# Patient Record
Sex: MALE | Race: WHITE | Employment: OTHER | URBAN - METROPOLITAN AREA
[De-identification: names, ages, dates, MRNs, and addresses within clinical notes are randomized per-mention and may not be internally consistent; named-entity substitution may affect disease eponyms.]

---

## 2017-06-18 ENCOUNTER — APPOINTMENT (OUTPATIENT)
Dept: CT IMAGING | Age: 72
End: 2017-06-18
Attending: EMERGENCY MEDICINE
Payer: MEDICARE

## 2017-06-18 ENCOUNTER — HOSPITAL ENCOUNTER (EMERGENCY)
Age: 72
Discharge: HOME | End: 2017-06-18
Attending: EMERGENCY MEDICINE
Payer: MEDICARE

## 2017-06-18 VITALS
OXYGEN SATURATION: 96 % | HEIGHT: 68 IN | WEIGHT: 164 LBS | HEART RATE: 62 BPM | BODY MASS INDEX: 24.86 KG/M2 | DIASTOLIC BLOOD PRESSURE: 72 MMHG | SYSTOLIC BLOOD PRESSURE: 154 MMHG | TEMPERATURE: 99 F | RESPIRATION RATE: 17 BRPM

## 2017-06-18 DIAGNOSIS — S00.03XA SCALP CONTUSION: Primary | ICD-10-CM

## 2017-06-18 PROCEDURE — 70450 CT HEAD/BRAIN W/O DYE: CPT | Performed by: EMERGENCY MEDICINE

## 2017-06-18 PROCEDURE — 99284 EMERGENCY DEPT VISIT MOD MDM: CPT

## 2017-06-18 RX ORDER — LISINOPRIL 10 MG/1
10 TABLET ORAL DAILY
COMMUNITY
End: 2017-07-24 | Stop reason: DRUGHIGH

## 2017-06-19 NOTE — ED PROVIDER NOTES
Patient Seen in: THE [facility] Emergency Department In [place]    History   Patient presents with:  Trauma (cardiovascular, musculoskeletal)    Stated Complaint: [address]    HPI    Patient slipped and fell tonight striking the right side of his foreh resting comfortably  Skin: Warm and dry without cyanosis or pallor. HEENT: Tender swelling to the right frontal scalp. No bony defect felt. Pupils equal round reactive to light. EOMI. No hemotympanum.   No intraoral injury  Neck: Supple and nontender

## 2017-07-24 ENCOUNTER — LAB ENCOUNTER (OUTPATIENT)
Dept: LAB | Age: 72
End: 2017-07-24
Attending: INTERNAL MEDICINE
Payer: MEDICARE

## 2017-07-24 ENCOUNTER — OFFICE VISIT (OUTPATIENT)
Dept: FAMILY MEDICINE CLINIC | Facility: CLINIC | Age: 72
End: 2017-07-24

## 2017-07-24 VITALS
BODY MASS INDEX: 24.86 KG/M2 | DIASTOLIC BLOOD PRESSURE: 90 MMHG | RESPIRATION RATE: 16 BRPM | TEMPERATURE: 98 F | WEIGHT: 164 LBS | SYSTOLIC BLOOD PRESSURE: 150 MMHG | HEIGHT: 68 IN | HEART RATE: 64 BPM

## 2017-07-24 DIAGNOSIS — R25.2 LEG CRAMPING: ICD-10-CM

## 2017-07-24 DIAGNOSIS — R26.89 BALANCE PROBLEM: ICD-10-CM

## 2017-07-24 DIAGNOSIS — M25.362 KNEE INSTABILITY, LEFT: Primary | ICD-10-CM

## 2017-07-24 DIAGNOSIS — I10 ESSENTIAL HYPERTENSION: ICD-10-CM

## 2017-07-24 DIAGNOSIS — G11.9 CEREBRAL ATAXIA (HCC): ICD-10-CM

## 2017-07-24 DIAGNOSIS — E78.00 PURE HYPERCHOLESTEROLEMIA: ICD-10-CM

## 2017-07-24 DIAGNOSIS — S89.92XD LEFT KNEE INJURY, SUBSEQUENT ENCOUNTER: ICD-10-CM

## 2017-07-24 DIAGNOSIS — R21 SKIN RASH: ICD-10-CM

## 2017-07-24 PROBLEM — M25.369 KNEE INSTABILITY: Status: ACTIVE | Noted: 2017-07-24

## 2017-07-24 PROBLEM — S89.92XA LEFT KNEE INJURY: Status: ACTIVE | Noted: 2017-07-24

## 2017-07-24 LAB
ALBUMIN SERPL-MCNC: 3.9 G/DL (ref 3.5–4.8)
ALP LIVER SERPL-CCNC: 76 U/L (ref 45–117)
ALT SERPL-CCNC: 48 U/L (ref 17–63)
AST SERPL-CCNC: 25 U/L (ref 15–41)
BASOPHILS # BLD AUTO: 0.02 X10(3) UL (ref 0–0.1)
BASOPHILS NFR BLD AUTO: 0.4 %
BILIRUB SERPL-MCNC: 0.3 MG/DL (ref 0.1–2)
BUN BLD-MCNC: 20 MG/DL (ref 8–20)
CALCIUM BLD-MCNC: 8.9 MG/DL (ref 8.3–10.3)
CHLORIDE: 108 MMOL/L (ref 101–111)
CO2: 27 MMOL/L (ref 22–32)
CREAT BLD-MCNC: 0.87 MG/DL (ref 0.7–1.3)
EOSINOPHIL # BLD AUTO: 0.08 X10(3) UL (ref 0–0.3)
EOSINOPHIL NFR BLD AUTO: 1.8 %
ERYTHROCYTE [DISTWIDTH] IN BLOOD BY AUTOMATED COUNT: 13.7 % (ref 11.5–16)
GLUCOSE BLD-MCNC: 121 MG/DL (ref 70–99)
HAV IGM SER QL: 2 MG/DL (ref 1.7–3)
HCT VFR BLD AUTO: 42.2 % (ref 37–53)
HGB BLD-MCNC: 13.6 G/DL (ref 13–17)
IMMATURE GRANULOCYTE COUNT: 0.02 X10(3) UL (ref 0–1)
IMMATURE GRANULOCYTE RATIO %: 0.4 %
LYMPHOCYTES # BLD AUTO: 1.46 X10(3) UL (ref 0.9–4)
LYMPHOCYTES NFR BLD AUTO: 32.5 %
M PROTEIN MFR SERPL ELPH: 7.2 G/DL (ref 6.1–8.3)
MCH RBC QN AUTO: 29.8 PG (ref 27–33.2)
MCHC RBC AUTO-ENTMCNC: 32.2 G/DL (ref 31–37)
MCV RBC AUTO: 92.3 FL (ref 80–99)
MONOCYTES # BLD AUTO: 0.35 X10(3) UL (ref 0.1–0.6)
MONOCYTES NFR BLD AUTO: 7.8 %
NEUTROPHIL ABS PRELIM: 2.56 X10 (3) UL (ref 1.3–6.7)
NEUTROPHILS # BLD AUTO: 2.56 X10(3) UL (ref 1.3–6.7)
NEUTROPHILS NFR BLD AUTO: 57.1 %
PLATELET # BLD AUTO: 130 10(3)UL (ref 150–450)
POTASSIUM SERPL-SCNC: 4.9 MMOL/L (ref 3.6–5.1)
RBC # BLD AUTO: 4.57 X10(6)UL (ref 3.8–5.8)
RED CELL DISTRIBUTION WIDTH-SD: 46.2 FL (ref 35.1–46.3)
SODIUM SERPL-SCNC: 141 MMOL/L (ref 136–144)
WBC # BLD AUTO: 4.5 X10(3) UL (ref 4–13)

## 2017-07-24 PROCEDURE — 80053 COMPREHEN METABOLIC PANEL: CPT

## 2017-07-24 PROCEDURE — 85025 COMPLETE CBC W/AUTO DIFF WBC: CPT

## 2017-07-24 PROCEDURE — 99204 OFFICE O/P NEW MOD 45 MIN: CPT | Performed by: INTERNAL MEDICINE

## 2017-07-24 PROCEDURE — 36415 COLL VENOUS BLD VENIPUNCTURE: CPT

## 2017-07-24 PROCEDURE — 83735 ASSAY OF MAGNESIUM: CPT

## 2017-07-24 RX ORDER — NAPROXEN SODIUM 220 MG
1-2 TABLET ORAL DAILY PRN
Status: ON HOLD | COMMUNITY
End: 2020-07-09

## 2017-07-24 RX ORDER — LISINOPRIL 5 MG/1
1 TABLET ORAL DAILY
Refills: 0 | COMMUNITY
Start: 2017-05-03 | End: 2018-10-30

## 2017-07-24 RX ORDER — CLOTRIMAZOLE AND BETAMETHASONE DIPROPIONATE 10; .64 MG/G; MG/G
1 CREAM TOPICAL 2 TIMES DAILY PRN
Qty: 60 G | Refills: 1 | Status: SHIPPED | OUTPATIENT
Start: 2017-07-24 | End: 2019-05-07 | Stop reason: ALTCHOICE

## 2017-07-24 NOTE — PROGRESS NOTES
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 Wanted to be seen for new options.    Notices balance issues and instability   Has fallen in the past.   Has been Essential hypertension     Pure hypercholesterolemia     Knee instability     Left knee injury      Past Surgical History:  No date: CATARACT  No date: EYE SURGERY  No date: OTHER SURGICAL HISTORY      Comment: RCR left    Family History   Problem Relation Orders Placed This Encounter      CBC [6399]      COMP METABOLIC PANEL [26784]      Magnesium [E]     Signed Prescriptions Disp Refills    clotrimazole-betamethasone 1-0.05 % External Cream 60 g 1      Sig: Apply 1 Application topically 2 (two) times

## 2017-07-27 ENCOUNTER — HOSPITAL ENCOUNTER (OUTPATIENT)
Dept: ULTRASOUND IMAGING | Facility: HOSPITAL | Age: 72
Discharge: HOME | End: 2017-07-27
Attending: INTERNAL MEDICINE
Payer: MEDICARE

## 2017-07-27 DIAGNOSIS — S89.92XA LEFT KNEE INJURY, INITIAL ENCOUNTER: ICD-10-CM

## 2017-07-27 DIAGNOSIS — R26.81 UNSTEADY GAIT: ICD-10-CM

## 2017-07-27 DIAGNOSIS — G54.0 BRACHIAL PLEXUS DISORDERS: Primary | ICD-10-CM

## 2017-07-27 DIAGNOSIS — R25.2 LEG CRAMPING: ICD-10-CM

## 2017-07-27 DIAGNOSIS — G54.0 BRACHIAL PLEXUS DISORDERS: ICD-10-CM

## 2017-07-27 DIAGNOSIS — M25.362 KNEE INSTABILITY, LEFT: ICD-10-CM

## 2017-07-27 PROCEDURE — 93923 UPR/LXTR ART STDY 3+ LVLS: CPT | Performed by: INTERNAL MEDICINE

## 2017-08-09 ENCOUNTER — OFFICE VISIT (OUTPATIENT)
Dept: NEUROLOGY | Facility: CLINIC | Age: 72
End: 2017-08-09

## 2017-08-09 VITALS — HEART RATE: 60 BPM | RESPIRATION RATE: 16 BRPM | DIASTOLIC BLOOD PRESSURE: 70 MMHG | SYSTOLIC BLOOD PRESSURE: 132 MMHG

## 2017-08-09 DIAGNOSIS — G11.8 SPINOCEREBELLAR ATAXIA (HCC): Primary | ICD-10-CM

## 2017-08-09 DIAGNOSIS — R29.2 HYPER REFLEXIA: ICD-10-CM

## 2017-08-09 DIAGNOSIS — R27.0 ATAXIA: ICD-10-CM

## 2017-08-09 PROCEDURE — 99204 OFFICE O/P NEW MOD 45 MIN: CPT | Performed by: OTHER

## 2017-08-09 NOTE — H&P
[facility] Patient / Consult Visit    Alva Dyson is a 70year old male.                          Referring MD: Maribell Lomas    Patient presents with:  Neurologic Problem      HPI:    Alva Dyson is a 70year old, who presents for e Packs/day: 0.00      Years: 0.00      Smokeless tobacco: Never Used                      Comment: quit many years ago  Alcohol use:  Yes              Comment: occasionly     Family History   Problem Relation Age of Onset   • Diabetes Father    • Cancer Moth sharp bilaterally    Cranial Nerves: II through XII  Optic:    Pupils: equally round and reactive to light with direct and consensual responses, normal accomodation   Visual acuity: Normal              Visual fields: Normal  Oculomotor/Trochlear/Abducens: no midline shift. There are no intraparenchymal brain abnormalities. There is nothing specific for acute infarct. There is no hemorrhage or mass lesion. SINUSES:           No sign of acute sinusitis.     MASTOIDS:          No sign of acute inflammatio will attempt to obtain records.       We will check MRI brain and cervical and thoracic spine.    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

## 2017-08-09 NOTE — PATIENT INSTRUCTIONS
Have MRI brain and spine when able  Will review records        After your visit at the [facility] [address]  office  today,  please direct any follow up questions or medication needs to the staff in our  [place] office so that your concerns may be promptly addres as MRI or CT scans, do not schedule the test until this office has notified you that the test has been approved by your insurer. Depending on your insurance carrier, approval may take 3-10 days.  It is highly recommended patients contact their insurance ca

## 2017-08-15 ENCOUNTER — TELEPHONE (OUTPATIENT)
Dept: FAMILY MEDICINE CLINIC | Facility: CLINIC | Age: 72
End: 2017-08-15

## 2017-08-15 NOTE — TELEPHONE ENCOUNTER
- Patient is having an MRI on 08/21/2017 and is claustrophobic and wants to know if Dr. Gilda Snyder can prescribe a prescription to help him calm down. Needs to be sent to [facility]ACY #[id number]- [place] [address] [phone], [phone].  Please

## 2017-08-15 NOTE — TELEPHONE ENCOUNTER
Olimpia not on HIPAA but we can take information. LMOM for Olimpia to call back and discuss. Has patient ever taken anything for claustophobia?

## 2017-08-15 NOTE — TELEPHONE ENCOUNTER
Patient called back and gave verbal to RN to speak with Marissa Hall today.  He has never taken anything for Claustrophobia and would like know if you would consider prescribing med for MRI scheduled 8/21/17:    Future Appointments  Date Time Provider Department

## 2017-08-16 PROBLEM — F40.240 CLAUSTROPHOBIA: Status: ACTIVE | Noted: 2017-08-16

## 2017-08-16 NOTE — TELEPHONE ENCOUNTER
Time started: 0907    Time ended: 0908    Total time spent on chart: one min    Patient informed to contact Dr. Tom Aponte. He verbalized understanding.

## 2017-08-16 NOTE — TELEPHONE ENCOUNTER
Patient scheduled for MRIs on Monday. Requesting oral sedative. Confirmed preferred pharmacy. Informed him that he will need  to/from procedure. Verbalized understanding.

## 2017-08-25 ENCOUNTER — HOSPITAL ENCOUNTER (OUTPATIENT)
Dept: MRI IMAGING | Age: 72
Discharge: HOME | End: 2017-08-25
Attending: Other
Payer: MEDICARE

## 2017-08-25 DIAGNOSIS — G11.8 SPINOCEREBELLAR ATAXIA (HCC): ICD-10-CM

## 2017-08-25 DIAGNOSIS — R27.0 ATAXIA: ICD-10-CM

## 2017-08-25 DIAGNOSIS — R29.2 HYPER REFLEXIA: ICD-10-CM

## 2017-08-25 PROCEDURE — A9575 INJ GADOTERATE MEGLUMI 0.1ML: HCPCS | Performed by: OTHER

## 2017-08-25 PROCEDURE — 72156 MRI NECK SPINE W/O & W/DYE: CPT | Performed by: OTHER

## 2017-08-25 PROCEDURE — 70553 MRI BRAIN STEM W/O & W/DYE: CPT | Performed by: OTHER

## 2017-08-25 PROCEDURE — 72157 MRI CHEST SPINE W/O & W/DYE: CPT | Performed by: OTHER

## 2017-08-25 NOTE — IMAGING NOTE
PT UNABLE TO COMPLETE POST MRI CERVICAL AND  THORACIC IMAGES DUE TO PAIN. PRE MRI CERVICAL AND THORACIC IMAGES COMPLETE.

## 2017-09-11 PROBLEM — I73.9 PAD (PERIPHERAL ARTERY DISEASE): Status: ACTIVE | Noted: 2017-09-11

## 2017-09-11 PROBLEM — I73.9 PAD (PERIPHERAL ARTERY DISEASE) (HCC): Status: ACTIVE | Noted: 2017-09-11

## 2017-09-27 ENCOUNTER — TELEPHONE (OUTPATIENT)
Dept: NEUROLOGY | Facility: CLINIC | Age: 72
End: 2017-09-27

## 2017-09-27 NOTE — TELEPHONE ENCOUNTER
----- Message from Suezanne Fothergill, MD sent at 9/22/2017 12:34 PM CDT -----  Results noted, MRI brain with cerebellar atrophy as expected; MRI spine with multilevel degenerative changes but no significant cord compression - can discuss further at next visit

## 2017-09-27 NOTE — TELEPHONE ENCOUNTER
LM informing patient of test results. Launch [employer] and print the 'Prescriptions from this Visit' Report

## 2017-10-04 ENCOUNTER — OFFICE VISIT (OUTPATIENT)
Dept: NEUROLOGY | Facility: CLINIC | Age: 72
End: 2017-10-04

## 2017-10-04 VITALS — SYSTOLIC BLOOD PRESSURE: 138 MMHG | HEART RATE: 64 BPM | DIASTOLIC BLOOD PRESSURE: 78 MMHG

## 2017-10-04 DIAGNOSIS — R29.2 HYPER REFLEXIA: ICD-10-CM

## 2017-10-04 DIAGNOSIS — R27.0 ATAXIA: Primary | ICD-10-CM

## 2017-10-04 PROCEDURE — 99213 OFFICE O/P EST LOW 20 MIN: CPT | Performed by: OTHER

## 2017-10-04 NOTE — PROGRESS NOTES
[facility] Progress Note    HPI  As per my initial H&P from 8/9/17:   Yesenia Strauss is a 70year old, who presents for evaluation of ataxia. Patient states since early 1970s, he has been having dizziness and unstable gait. left   Family History   Problem Relation Age of Onset   • Diabetes Father    • Cancer Mother      bladder   • Other [OTHER] Mother      knee replacement and hip      Social History    Marital status:             Spouse name:                        Adolfo Jang lb.    Gen: well developed, well nourished, no acute distress  HEENT: normocephalic  Heart; normal G3/M8, regular rate and rhythm  Lungs: clear to auscultation bilaterally  Extremities: no edema, peripheral pulses intact    Neck: supple, full range of kristen sinus. Otherwise, the visualized paranasal sinuses and mastoid air cells are unremarkable. The expected major intracranial flow voids are present.        =====  CONCLUSION:    1. No acute intracranial abnormality.   2. Advanced cerebellar atrophy again not identified. BONES:  Normal alignment without significant spondylosis or scoliosis. T7 compression deformity is moderate in degree and stable. DISCS:  No significant disc/facet abnormality, spinal stenosis, or foraminal narrowing.     OTHER:  Unremarkable METHYLMALONIC ACID, SERUM        As noted above     (R29.2) Hyper reflexia  Plan: VITAMIN B12, METHYLMALONIC ACID, SERUM        As noted above     Return if symptoms worsen or fail to improve.       Phylicia White MD, Neurology  [facility]

## 2017-10-04 NOTE — PATIENT INSTRUCTIONS
Set up appoinntment with Dr. Lori Neumann at earliest convenience at [facility] office  Have labs done    After your visit at the [facility] office  today,  please direct any follow up questions or medication needs to the staff in our  [facility] office so th physician has ordered radiology tests such as MRI or CT scans, do not schedule the test until this office has notified you that the test has been approved by your insurer. Depending on your insurance carrier, approval may take 3-10 days.  It is highly nayana

## 2017-11-03 ENCOUNTER — TELEPHONE (OUTPATIENT)
Dept: NEUROLOGY | Facility: CLINIC | Age: 72
End: 2017-11-03

## 2017-12-04 ENCOUNTER — TELEPHONE (OUTPATIENT)
Dept: NEUROLOGY | Facility: CLINIC | Age: 72
End: 2017-12-04

## 2018-01-04 ENCOUNTER — TELEPHONE (OUTPATIENT)
Dept: NEUROLOGY | Facility: CLINIC | Age: 73
End: 2018-01-04

## 2018-05-25 ENCOUNTER — PATIENT OUTREACH (OUTPATIENT)
Dept: FAMILY MEDICINE CLINIC | Facility: CLINIC | Age: 73
End: 2018-05-25

## 2018-06-06 ENCOUNTER — LAB ENCOUNTER (OUTPATIENT)
Dept: LAB | Age: 73
End: 2018-06-06
Attending: FAMILY MEDICINE
Payer: MEDICARE

## 2018-06-06 ENCOUNTER — OFFICE VISIT (OUTPATIENT)
Dept: FAMILY MEDICINE CLINIC | Facility: CLINIC | Age: 73
End: 2018-06-06

## 2018-06-06 VITALS
RESPIRATION RATE: 16 BRPM | SYSTOLIC BLOOD PRESSURE: 136 MMHG | TEMPERATURE: 97 F | HEART RATE: 68 BPM | DIASTOLIC BLOOD PRESSURE: 68 MMHG | HEIGHT: 68 IN | WEIGHT: 164 LBS | BODY MASS INDEX: 24.86 KG/M2

## 2018-06-06 DIAGNOSIS — I10 ESSENTIAL HYPERTENSION: ICD-10-CM

## 2018-06-06 DIAGNOSIS — Z00.00 WELLNESS EXAMINATION: Primary | ICD-10-CM

## 2018-06-06 DIAGNOSIS — Z12.5 SCREENING PSA (PROSTATE SPECIFIC ANTIGEN): ICD-10-CM

## 2018-06-06 DIAGNOSIS — Z00.00 ENCOUNTER FOR ANNUAL HEALTH EXAMINATION: ICD-10-CM

## 2018-06-06 PROCEDURE — G0439 PPPS, SUBSEQ VISIT: HCPCS | Performed by: FAMILY MEDICINE

## 2018-06-06 PROCEDURE — 80061 LIPID PANEL: CPT

## 2018-06-06 PROCEDURE — 36415 COLL VENOUS BLD VENIPUNCTURE: CPT

## 2018-06-06 PROCEDURE — 85025 COMPLETE CBC W/AUTO DIFF WBC: CPT

## 2018-06-06 NOTE — PROGRESS NOTES
HPI:   Roberto Gilbert is a 67year old male who presents for a Medicare Subsequent Annual Wellness visit (Pt already had Initial Annual Wellness).     Mr. Sage Chen is a pleasant 80-year-old male with history of hypertension, hyperlipidemia, hypogonadism, cere functional status. Preparing your meals: Need some help  He has difficulties Managing Money/Bills based on screening of functional status.    Managing money/bills: Able without help  He has difficulties Shopping for Groceries based on screening of functio 48 07/24/2017   CA 8.9 07/24/2017   ALB 3.9 07/24/2017   CREATSERUM 0.87 07/24/2017    (H) 07/24/2017        CBC  (most recent labs)     Lab Results  Component Value Date   WBC 4.5 07/24/2017   HGB 13.6 07/24/2017   .0 (L) 07/24/2017        A dizziness, tremors, light-headedness and numbness.         EXAM:   /68 (BP Location: Right arm, Patient Position: Sitting, Cuff Size: adult)   Pulse 68   Temp 97.3 °F (36.3 °C) (Temporal)   Resp 16   Ht 68\"   Wt 164 lb   BMI 24.94 kg/m²   Estimated b SUMMARY:   Diagnoses and all orders for this visit:    Wellness examination    Essential hypertension  -     CBC WITH DIFFERENTIAL WITH PLATELET; Future  -     COMP METABOLIC PANEL (14); Future  -     LIPID PANEL;  Future    Screening PSA (prostate specific Colonoscopy Screen every 10 years Colonoscopy,10 Years due on 11/10/1995 Update Health Maintenance if applicable    Flex Sigmoidoscopy Screen every 10 years No results found for this or any previous visit. No flowsheet data found.      Fecal Occult Blood An (mg/dL)   Date Value   07/24/2017 0.87    No flowsheet data found. Drug Serum Conc  Annually No results found for: DIGOXIN, DIG, VALP No flowsheet data found.

## 2018-06-06 NOTE — PATIENT INSTRUCTIONS
Thank you for choosing Leena Mcneal MD at [place] [address]  To Do: Jani Adam  1. Please see age appropriate health prevention below    [facility] is located in [address]. Monday, Tuesday & Friday – 8 a.m. to 4 p.m.   Wednesday, Thursda • Please call our office about any questions regarding your treatment/medicines/tests as a result of today's visit.  For your safety, read the entire package insert of all medicines prescribed to you and be aware of all of the risks of treatment even beyon Screening tests and vaccines are an important part of managing your health. Health counseling is essential, too. Below are guidelines for these, for men ages 72 and older. Talk with your healthcare provider to make sure you’re up-to-date on what you need. Prostate cancer All men in this age group, talk to healthcare provider about risks and benefits of digital rectal exam (ALLY) and prostate-specific antigen (PSA) screening1 At routine exams   Syphilis Men at increased risk for infection – talk with your hea Fall prevention (exercise, vitamin D supplements) All men in this age group At routine exams   Sexually transmitted infection Men at increased risk for infection – talk with your healthcare provider At routine exams   Use of daily aspirin Men ages 39 to 78 EKG - covered if needed at Welcome to Medicare, and non-screening if indicated for medical reasons    Electrocardiogram date Routine EKG is not a screening covered service except at the Hecker to Medicare Visit    Abdominal aortic aneurysm screening (onc Covered Once after 65 No orders found for this or any previous visit. Please get once after your 65th birthday    Hepatitis B for Moderate/High Risk No orders found for this or any previous visit.  Medium/high risk factors:   End-stage renal disease   Hemop

## 2018-07-16 ENCOUNTER — TELEPHONE (OUTPATIENT)
Dept: FAMILY MEDICINE CLINIC | Facility: CLINIC | Age: 73
End: 2018-07-16

## 2018-07-16 NOTE — TELEPHONE ENCOUNTER
Pt's son dropped off parking placard form to be filled by dr Koby Ireland he is aware dr Koby Ireland is on vacation he would like a call when forms are completed. . Forms are up front in doctors bin

## 2018-10-30 ENCOUNTER — OFFICE VISIT (OUTPATIENT)
Dept: FAMILY MEDICINE CLINIC | Facility: CLINIC | Age: 73
End: 2018-10-30
Payer: MEDICARE

## 2018-10-30 VITALS
DIASTOLIC BLOOD PRESSURE: 70 MMHG | TEMPERATURE: 98 F | RESPIRATION RATE: 16 BRPM | WEIGHT: 168 LBS | HEIGHT: 64 IN | HEART RATE: 70 BPM | SYSTOLIC BLOOD PRESSURE: 148 MMHG | BODY MASS INDEX: 28.68 KG/M2

## 2018-10-30 DIAGNOSIS — Z28.21 REFUSED INFLUENZA VACCINE: ICD-10-CM

## 2018-10-30 DIAGNOSIS — I10 ESSENTIAL HYPERTENSION: Primary | ICD-10-CM

## 2018-10-30 DIAGNOSIS — E78.00 PURE HYPERCHOLESTEROLEMIA: ICD-10-CM

## 2018-10-30 DIAGNOSIS — G11.9 CEREBRAL ATAXIA (HCC): ICD-10-CM

## 2018-10-30 PROCEDURE — 99214 OFFICE O/P EST MOD 30 MIN: CPT | Performed by: FAMILY MEDICINE

## 2018-10-30 RX ORDER — LISINOPRIL 10 MG/1
10 TABLET ORAL DAILY
Qty: 90 TABLET | Refills: 3 | Status: SHIPPED | OUTPATIENT
Start: 2018-10-30 | End: 2019-01-28

## 2018-10-30 NOTE — PATIENT INSTRUCTIONS
Thank you for choosing Ninoska Wood MD at [place] [address]  To Do: Viola Adam  1. Please take meds as directed. [facility] is located in [address]. Monday, Tuesday & Friday – 8 a.m. to 4 p.m. Wednesday, Thursday – 7 a.m. to 3 p.m. those potential risks and we strive to make you healthier and to improve your quality of life.     Referrals, and Radiology Information:    If your insurance requires a referral to a specialist, please allow 5 business days to process your referral request.

## 2018-10-30 NOTE — PROGRESS NOTES
HPI:    Patient ID: Shanta Zheng is a 67year old male. HPI  Mr. Yamileth Méndez is a pleasant 77-year-old male with history of hypertension, hyperlipidemia, hypogonadism, cerebral/cerebellar ataxia accompanied by his brother. He is wheelchair-bound.   He will days a week. Saturday and Sunday 2 patches  Disp:  Rfl:      Allergies:  Neosporin [Neomycin*    RASH   PHYSICAL EXAM:   Physical Exam   Constitutional: No distress.    HENT:   Mouth/Throat: Oropharynx is clear and moist.   Mild ecchymotic area on the right

## 2019-01-28 RX ORDER — LISINOPRIL 10 MG/1
10 TABLET ORAL DAILY
Qty: 90 TABLET | Refills: 1 | Status: SHIPPED | OUTPATIENT
Start: 2019-01-28 | End: 2019-07-08

## 2019-05-07 ENCOUNTER — OFFICE VISIT (OUTPATIENT)
Dept: FAMILY MEDICINE CLINIC | Facility: CLINIC | Age: 74
End: 2019-05-07
Payer: MEDICARE

## 2019-05-07 ENCOUNTER — LAB ENCOUNTER (OUTPATIENT)
Dept: LAB | Age: 74
End: 2019-05-07
Attending: FAMILY MEDICINE
Payer: MEDICARE

## 2019-05-07 VITALS
TEMPERATURE: 98 F | RESPIRATION RATE: 16 BRPM | DIASTOLIC BLOOD PRESSURE: 78 MMHG | BODY MASS INDEX: 24.91 KG/M2 | HEART RATE: 66 BPM | SYSTOLIC BLOOD PRESSURE: 148 MMHG | HEIGHT: 66 IN | WEIGHT: 155 LBS

## 2019-05-07 DIAGNOSIS — E78.00 PURE HYPERCHOLESTEROLEMIA: ICD-10-CM

## 2019-05-07 DIAGNOSIS — M19.049 HAND ARTHRITIS: ICD-10-CM

## 2019-05-07 DIAGNOSIS — L91.8 CUTANEOUS TAG: ICD-10-CM

## 2019-05-07 DIAGNOSIS — I10 ESSENTIAL HYPERTENSION: ICD-10-CM

## 2019-05-07 DIAGNOSIS — I10 ESSENTIAL HYPERTENSION: Primary | ICD-10-CM

## 2019-05-07 PROCEDURE — 99214 OFFICE O/P EST MOD 30 MIN: CPT | Performed by: FAMILY MEDICINE

## 2019-05-07 PROCEDURE — 80061 LIPID PANEL: CPT

## 2019-05-07 PROCEDURE — 80053 COMPREHEN METABOLIC PANEL: CPT

## 2019-05-07 PROCEDURE — 36415 COLL VENOUS BLD VENIPUNCTURE: CPT

## 2019-05-07 PROCEDURE — 85025 COMPLETE CBC W/AUTO DIFF WBC: CPT

## 2019-05-07 NOTE — PROGRESS NOTES
HPI:    Patient ID: Zulma Storm is a 68year old male. HPI  Mr. Rasheed Ortega is a pleasant 40-year-old male with history of hypertension, hyperlipidemia, hypogonadism, cerebral/cerebellar ataxia, arthritis here for his follow-up appointment.   He recently c Testosterone (ANDRODERM) 4 MG/24HR Transdermal Patch 24 Hr Place 1 patch onto the skin. 5 days a week. Saturday and Sunday 2 patches  Disp:  Rfl:      Allergies:  Neosporin [Neomycin*    RASH   PHYSICAL EXAM:   Physical Exam   Constitutional: No distress. CBC W Differential W Platelet [E]      Comp Metabolic Panel (14) [E]      Lipid Panel [E]      Meds This Visit:  Requested Prescriptions      No prescriptions requested or ordered in this encounter       Imaging & Referrals:  None       LY#[id number]

## 2019-05-07 NOTE — PATIENT INSTRUCTIONS
Thank you for choosing Sravani Cohen MD at [place] [address]  To Do: Vivienne Adam  1. Please take meds as directed. [facility] [place] is located in [address]. Monday, Tuesday & Friday – 8 a.m. to 4 p.m. Wednesday, Thursday – 7 a.m. to 3 p.m. those potential risks and we strive to make you healthier and to improve your quality of life.     Referrals, and Radiology Information:    If your insurance requires a referral to a specialist, please allow 5 business days to process your referral request.

## 2019-05-24 ENCOUNTER — TELEPHONE (OUTPATIENT)
Dept: FAMILY MEDICINE CLINIC | Facility: CLINIC | Age: 74
End: 2019-05-24

## 2019-05-24 NOTE — TELEPHONE ENCOUNTER
Patient states his oncologist is retiring and needs a new referral for another oncologist, please advise.

## 2019-05-24 NOTE — TELEPHONE ENCOUNTER
I spoke to patient and told him we will call him next week. He is in agreement with plan. He states he sees a Oncologist for his Cholesterol. B/P and testosterone level, he said he does not have cancer? Do you know what or who he is talking about?

## 2019-05-30 NOTE — TELEPHONE ENCOUNTER
LMOM for pt to return call if pt is still looking for a Recommendation from Dr. Claudette Berumen for a Specialist doctor. Task completed at this time.

## 2019-07-08 ENCOUNTER — TELEPHONE (OUTPATIENT)
Dept: FAMILY MEDICINE CLINIC | Facility: CLINIC | Age: 74
End: 2019-07-08

## 2019-07-08 RX ORDER — LISINOPRIL 10 MG/1
10 TABLET ORAL
Qty: 90 TABLET | Refills: 1 | Status: SHIPPED | OUTPATIENT
Start: 2019-07-08 | End: 2020-01-28

## 2019-07-08 RX ORDER — LISINOPRIL 10 MG/1
TABLET ORAL
Qty: 90 TABLET | Refills: 1 | Status: SHIPPED | OUTPATIENT
Start: 2019-07-08 | End: 2019-07-08

## 2019-07-08 NOTE — TELEPHONE ENCOUNTER
[facility] transferred order to [address] [place] [address]. Task completed.

## 2019-07-08 NOTE — TELEPHONE ENCOUNTER
Patient states the refill went to the wrong pharmacy, it needs to go to [facility] in [place]. Please advise. Also his oncologist retired.

## 2019-07-08 NOTE — TELEPHONE ENCOUNTER
Hypertension Medications Protocol Passed7/8 11:31 AM   CMP or BMP in past 12 months    Last serum creatinine< 2.0    Appointment in past 6 or next 3 months

## 2019-08-12 ENCOUNTER — TELEPHONE (OUTPATIENT)
Dept: FAMILY MEDICINE CLINIC | Facility: CLINIC | Age: 74
End: 2019-08-12

## 2019-08-12 NOTE — TELEPHONE ENCOUNTER
Spoke to pt, last BM \"was either Friday or Saturday\", was normal.  Feels drinking well  Urinating well  No abdominal pain, no fever  Some rectal bright red blood spots upon wiping  No known history hemorrhoids  Has not taken any laxative  States sits on

## 2019-08-12 NOTE — TELEPHONE ENCOUNTER
Patient believes he has a bowel obstruction. No pain, but he states that when he has the urge, it will not come out.   Future Appointments   Date Time Provider Alfred Conway   8/14/2019 11:30 AM Pilar Jalloh MD [facility] [address] [facility]   10/31/2019 10:00

## 2019-08-14 ENCOUNTER — OFFICE VISIT (OUTPATIENT)
Dept: FAMILY MEDICINE CLINIC | Facility: CLINIC | Age: 74
End: 2019-08-14
Payer: MEDICARE

## 2019-08-14 VITALS
DIASTOLIC BLOOD PRESSURE: 60 MMHG | HEART RATE: 60 BPM | TEMPERATURE: 98 F | RESPIRATION RATE: 14 BRPM | SYSTOLIC BLOOD PRESSURE: 110 MMHG

## 2019-08-14 DIAGNOSIS — K59.01 SLOW TRANSIT CONSTIPATION: Primary | ICD-10-CM

## 2019-08-14 PROCEDURE — 99213 OFFICE O/P EST LOW 20 MIN: CPT | Performed by: FAMILY MEDICINE

## 2019-08-14 RX ORDER — DOCUSATE SODIUM 100 MG/1
100 CAPSULE, LIQUID FILLED ORAL 2 TIMES DAILY
COMMUNITY
End: 2019-10-17

## 2019-08-14 NOTE — PROGRESS NOTES
HPI:    Patient ID: Humberto Aldana is a 68year old male.     HPI  Mr. Sobeida Niño is a pleasant 72-year-old male with history of hypertension, hyperlipidemia, hypogonadism, cerebral/cerebellar ataxia who is wheelchair-bound here today as he has had mild abdomin Rfl:      Allergies:  Neosporin [Neomycin*    RASH   PHYSICAL EXAM:   Physical Exam   Constitutional: No distress. HENT:   Mouth/Throat: Oropharynx is clear and moist. No oropharyngeal exudate. Eyes: Conjunctivae are normal. No scleral icterus.    Neck:

## 2019-10-13 ENCOUNTER — HOSPITAL ENCOUNTER (EMERGENCY)
Facility: HOSPITAL | Age: 74
Discharge: HOME | End: 2019-10-13
Attending: EMERGENCY MEDICINE
Payer: MEDICARE

## 2019-10-13 ENCOUNTER — APPOINTMENT (OUTPATIENT)
Dept: GENERAL RADIOLOGY | Facility: HOSPITAL | Age: 74
End: 2019-10-13
Attending: EMERGENCY MEDICINE
Payer: MEDICARE

## 2019-10-13 VITALS
TEMPERATURE: 97 F | OXYGEN SATURATION: 97 % | WEIGHT: 154.31 LBS | HEART RATE: 58 BPM | DIASTOLIC BLOOD PRESSURE: 67 MMHG | RESPIRATION RATE: 10 BRPM | BODY MASS INDEX: 25 KG/M2 | SYSTOLIC BLOOD PRESSURE: 166 MMHG

## 2019-10-13 DIAGNOSIS — R07.89 CHEST PAIN, ATYPICAL: Primary | ICD-10-CM

## 2019-10-13 DIAGNOSIS — R42 VERTIGO: ICD-10-CM

## 2019-10-13 PROCEDURE — 80053 COMPREHEN METABOLIC PANEL: CPT | Performed by: EMERGENCY MEDICINE

## 2019-10-13 PROCEDURE — 93005 ELECTROCARDIOGRAM TRACING: CPT

## 2019-10-13 PROCEDURE — 99285 EMERGENCY DEPT VISIT HI MDM: CPT

## 2019-10-13 PROCEDURE — 85379 FIBRIN DEGRADATION QUANT: CPT | Performed by: EMERGENCY MEDICINE

## 2019-10-13 PROCEDURE — 71045 X-RAY EXAM CHEST 1 VIEW: CPT | Performed by: EMERGENCY MEDICINE

## 2019-10-13 PROCEDURE — 93010 ELECTROCARDIOGRAM REPORT: CPT

## 2019-10-13 PROCEDURE — 36415 COLL VENOUS BLD VENIPUNCTURE: CPT

## 2019-10-13 PROCEDURE — 85025 COMPLETE CBC W/AUTO DIFF WBC: CPT | Performed by: EMERGENCY MEDICINE

## 2019-10-13 PROCEDURE — 99284 EMERGENCY DEPT VISIT MOD MDM: CPT

## 2019-10-13 PROCEDURE — 84484 ASSAY OF TROPONIN QUANT: CPT | Performed by: EMERGENCY MEDICINE

## 2019-10-13 RX ORDER — MECLIZINE HYDROCHLORIDE 25 MG/1
25 TABLET ORAL 3 TIMES DAILY PRN
Qty: 30 TABLET | Refills: 0 | Status: SHIPPED | OUTPATIENT
Start: 2019-10-13 | End: 2019-10-17

## 2019-10-14 ENCOUNTER — TELEPHONE (OUTPATIENT)
Dept: FAMILY MEDICINE CLINIC | Facility: CLINIC | Age: 74
End: 2019-10-14

## 2019-10-14 NOTE — TELEPHONE ENCOUNTER
[facility] after ED visit for chest pain, offered an appt, left our phone number. Patient has [facility] wellness 10/21/19 scheduled.

## 2019-10-14 NOTE — ED INITIAL ASSESSMENT (HPI)
Pt arrives with c/o chest pain. Pt states he was sleeping this morning and he was woken up by right side chest wall pain that radiated to his left chest wall. Pt states he was dizzy, no sweating. It lasted less than a minute.  Pt went back to sleep and had

## 2019-10-14 NOTE — ED PROVIDER NOTES
Patient Seen in: [facility] Emergency Department      History   Patient presents with:  Chest Pain Angina (cardiovascular)    Stated Complaint: chest pain    HPI    45-year-old male stated that he had a sharp right-sided chest pain that went across Wt 70 kg   SpO2 97%   BMI 24.91 kg/m²         Physical Exam    General appearance: This is an elderly male sitting on a gurney. Vital signs were reviewed per nurse's notes. HEENT: Normocephalic atraumatic. Anicteric sclera.   Oral mucosa is moist.  Orop noted on EKG Report. Rate: 61  Rhythm: Sinus Rhythm  Reading: Normal EKG. Agree with EKG report. Xr Chest Ap Portable  (cpt=71045)    Result Date: 10/13/2019  CONCLUSION:  No active disease seen.    Dictated by: Amalia Virk MD on 10/13/20

## 2019-10-17 ENCOUNTER — OFFICE VISIT (OUTPATIENT)
Dept: FAMILY MEDICINE CLINIC | Facility: CLINIC | Age: 74
End: 2019-10-17
Payer: MEDICARE

## 2019-10-17 VITALS
BODY MASS INDEX: 23 KG/M2 | HEART RATE: 65 BPM | TEMPERATURE: 98 F | RESPIRATION RATE: 14 BRPM | OXYGEN SATURATION: 100 % | WEIGHT: 140 LBS | SYSTOLIC BLOOD PRESSURE: 130 MMHG | DIASTOLIC BLOOD PRESSURE: 60 MMHG

## 2019-10-17 DIAGNOSIS — Z00.00 ENCOUNTER FOR MEDICARE ANNUAL WELLNESS EXAM: Primary | ICD-10-CM

## 2019-10-17 DIAGNOSIS — Z23 NEED FOR VACCINATION: ICD-10-CM

## 2019-10-17 DIAGNOSIS — H61.23 IMPACTED CERUMEN OF BOTH EARS: ICD-10-CM

## 2019-10-17 DIAGNOSIS — Z00.00 ENCOUNTER FOR ANNUAL HEALTH EXAMINATION: ICD-10-CM

## 2019-10-17 DIAGNOSIS — Z12.11 SPECIAL SCREENING FOR MALIGNANT NEOPLASMS, COLON: ICD-10-CM

## 2019-10-17 PROCEDURE — G0439 PPPS, SUBSEQ VISIT: HCPCS | Performed by: FAMILY MEDICINE

## 2019-10-17 PROCEDURE — 99213 OFFICE O/P EST LOW 20 MIN: CPT | Performed by: FAMILY MEDICINE

## 2019-10-17 RX ORDER — ROSUVASTATIN CALCIUM 10 MG/1
10 TABLET, COATED ORAL DAILY
Qty: 90 TABLET | Refills: 1 | Status: SHIPPED | OUTPATIENT
Start: 2019-10-17 | End: 2020-06-09

## 2019-10-17 NOTE — PATIENT INSTRUCTIONS
Thank you for choosing Maddi Kirby MD at [place] [address]  To Do: Alanna Adam  1. Please see age appropriate health prevention below    [employer] is located in [address]. Monday, Tuesday & Friday – 8 a.m. to 4 p.m.   Wednesday, Thursda the benefits outweigh those potential risks and we strive to make you healthier and to improve your quality of life.     Referrals, and Radiology Information:    If your insurance requires a referral to a specialist, please allow 5 business days to process have ever smoked 1 ultrasound   Alcohol misuse All men in this age group At routine exams   Blood pressure All men in this age group Yearly checkup if your blood pressure is normal  Normal blood pressure is less than 120/80 mm Hg  If your blood pressure re talk with your healthcare provider Ask your healthcare provider   Vision All men in this age group Every 1 to 2 years; if you have a chronic health condition, ask your healthcare provider if you needs exams more often   Vaccine Who needs it How often   Chi effects it can cause All men in this age group Every visit   [address] [place] [address] [facility]   Date Last Reviewed: 2/1/2017  © 6957-0069 [employer]. [address] [facility], [address] [place] [address]. All rights reserved.  This information is n screening (once between ages 73-68)  No results found for this or any previous visit.  Limited to patients who meet one of the following criteria:   • Men who are 73-68 years old and have smoked more than 100 cigarettes in their lifetime   • Anyone with a f received Factor VIII or IX concentrates   Clients of institutions for the mentally retarded   Persons who live in the same house as a HepB virus carrier   Homosexual men   Illicit injectable drug abusers     Tetanus Toxoid- Only covered with a cut with met

## 2019-10-17 NOTE — PROGRESS NOTES
HPI:   Davis Johns is a 68year old male who presents for a Medicare Subsequent Annual Wellness visit (Pt already had Initial Annual Wellness).     Mr. William Gupta is a pleasant 70-year-old male with history of hypertension, hyperlipidemia, hypogonadism, cere have it on file in [facility] Not Discussed         He does have a POA but we do NOT have it on file in [facility] [address] Not Discussed           He smoked tobacco in the past but quit greater than 12 months ago. Social History    Tobacco Use      Smoking status:  Form Oral Tab, Take 1-2 tablets by mouth daily as needed. Multiple Vitamin (DAILY VITAMINS) Oral Tab, Take 1 tablet by mouth daily.        MEDICAL INFORMATION:   He  has a past medical history of Hypogonadism in male, Neuropathy, Osteoarthritis of left knee (20 Bowel sounds are normal. He exhibits no distension and no mass. There is no tenderness. Musculoskeletal: He exhibits no edema. Lymphadenopathy:     He has no cervical adenopathy. Neurological: He is alert.    Psychiatric: He has a normal mood and affe level?: Stretching  How would you describe your daily physical activity?: None  How would you describe your current health state?: Fair  How do you maintain positive mental well-being?: Puzzles;Games; Visiting Family    This section provided for quick revie Clients of institutions for the mentally retarded   Persons who live in the same house as a HepB virus carrier   Homosexual men   Illicit injectable drug abusers     Tetanus Toxoid  Only covered with a cut with metal- TD and TDaP Not covered by [facility]

## 2019-11-25 ENCOUNTER — TELEPHONE (OUTPATIENT)
Dept: FAMILY MEDICINE CLINIC | Facility: CLINIC | Age: 74
End: 2019-11-25

## 2019-12-31 NOTE — TELEPHONE ENCOUNTER
Requesting Androderm 4mg  LOV: 10/17/19  RTC: 6 months  Last Relevant Labs: No testosterone labs on file  Filled: 11/25/19 #30 patches with 0 refills    No future appointments.     Rx pended and routed to MD for approval/denial

## 2020-01-28 RX ORDER — LISINOPRIL 10 MG/1
10 TABLET ORAL
Qty: 90 TABLET | Refills: 1 | Status: SHIPPED | OUTPATIENT
Start: 2020-01-28 | End: 2020-06-09

## 2020-01-28 NOTE — TELEPHONE ENCOUNTER
Requesting Lisinopril 10mg  LOV: 10/14/19 Physical  RTC: 7 months  Last Relevant Labs: annual labs done 5/7/19  Filled: 12/31/19 #30 with 0 refills    Requesting Testosterone Patch  LOV: 10/17/19 Physical  RTC: 7 months  Last Relevant Labs: annual labs don

## 2020-06-09 ENCOUNTER — TELEPHONE (OUTPATIENT)
Dept: FAMILY MEDICINE CLINIC | Facility: CLINIC | Age: 75
End: 2020-06-09

## 2020-06-09 RX ORDER — ROSUVASTATIN CALCIUM 10 MG/1
TABLET, COATED ORAL
Qty: 30 TABLET | Refills: 0 | Status: SHIPPED | OUTPATIENT
Start: 2020-06-09 | End: 2020-07-02

## 2020-06-09 RX ORDER — LISINOPRIL 10 MG/1
TABLET ORAL
Qty: 30 TABLET | Refills: 0 | Status: SHIPPED | OUTPATIENT
Start: 2020-06-09 | End: 2020-07-02

## 2020-06-09 NOTE — TELEPHONE ENCOUNTER
Cholesterol Medication Protocol Failed6/9 3:22 PM   Lipid panel within past 12 months    ALT < 80    ALT resulted within past year    Appointment within past 12 or next 3 months          Hypertension Medications Protocol Failed6/9 3:22 PM   Appointmen

## 2020-07-02 RX ORDER — ROSUVASTATIN CALCIUM 10 MG/1
TABLET, COATED ORAL
Qty: 30 TABLET | Refills: 0 | Status: SHIPPED | OUTPATIENT
Start: 2020-07-02 | End: 2020-07-08 | Stop reason: CLARIF

## 2020-07-02 RX ORDER — LISINOPRIL 10 MG/1
TABLET ORAL
Qty: 30 TABLET | Refills: 0 | Status: SHIPPED | OUTPATIENT
Start: 2020-07-02 | End: 2020-07-08 | Stop reason: CLARIF

## 2020-07-02 NOTE — TELEPHONE ENCOUNTER
Cholesterol Medication Protocol Failed7/2 12:38 PM   Lipid panel within past 12 months    ALT < 80    ALT resulted within past year    Appointment within past 12 or next 3 months       Hypertension Medications Protocol Passed7/2 12:38 PM   CMP or BMP in pa

## 2020-07-06 ENCOUNTER — HOSPITAL ENCOUNTER (OUTPATIENT)
Dept: GENERAL RADIOLOGY | Age: 75
Discharge: HOME | DRG: 247 | End: 2020-07-06
Attending: FAMILY MEDICINE
Payer: MEDICARE

## 2020-07-06 ENCOUNTER — OFFICE VISIT (OUTPATIENT)
Dept: FAMILY MEDICINE CLINIC | Facility: CLINIC | Age: 75
End: 2020-07-06
Payer: MEDICARE

## 2020-07-06 VITALS
DIASTOLIC BLOOD PRESSURE: 80 MMHG | SYSTOLIC BLOOD PRESSURE: 110 MMHG | TEMPERATURE: 98 F | HEART RATE: 60 BPM | WEIGHT: 148 LBS | RESPIRATION RATE: 16 BRPM | BODY MASS INDEX: 24 KG/M2 | OXYGEN SATURATION: 99 %

## 2020-07-06 DIAGNOSIS — M25.511 CHRONIC RIGHT SHOULDER PAIN: Primary | ICD-10-CM

## 2020-07-06 DIAGNOSIS — G89.29 CHRONIC RIGHT SHOULDER PAIN: Primary | ICD-10-CM

## 2020-07-06 DIAGNOSIS — E29.1 HYPOGONADISM IN MALE: ICD-10-CM

## 2020-07-06 DIAGNOSIS — Z12.5 SCREENING FOR MALIGNANT NEOPLASM OF PROSTATE: ICD-10-CM

## 2020-07-06 DIAGNOSIS — E78.00 PURE HYPERCHOLESTEROLEMIA: ICD-10-CM

## 2020-07-06 DIAGNOSIS — M25.511 CHRONIC RIGHT SHOULDER PAIN: ICD-10-CM

## 2020-07-06 DIAGNOSIS — G89.29 CHRONIC RIGHT SHOULDER PAIN: ICD-10-CM

## 2020-07-06 PROCEDURE — 73030 X-RAY EXAM OF SHOULDER: CPT | Performed by: FAMILY MEDICINE

## 2020-07-06 PROCEDURE — 99214 OFFICE O/P EST MOD 30 MIN: CPT | Performed by: FAMILY MEDICINE

## 2020-07-06 RX ORDER — ALLOPURINOL 100 MG/1
100 TABLET ORAL 2 TIMES DAILY
COMMUNITY
Start: 2020-06-10 | End: 2020-08-20

## 2020-07-06 NOTE — PROGRESS NOTES
HPI:    Patient ID: Rell Laird is a 76year old male.     HPI  Mr. Kristie Xiong is a pleasant 60-year-old gentleman with history of hypertension, hyperlipidemia, hypogonadism on testosterone injections, arthritis presenting today for chronic right shoulde Constitutional: No distress. HENT:   Mouth/Throat: Oropharynx is clear and moist. No oropharyngeal exudate. Eyes: Conjunctivae are normal. No scleral icterus. Neck: Neck supple. Cardiovascular: Normal rate, regular rhythm and normal heart sounds.

## 2020-07-06 NOTE — PATIENT INSTRUCTIONS
Thank you for choosing Feroz Moses MD at [place] [address]  To Do: Yue Adam  1. Please take meds as directed. [facility] is located in [address]. Monday, Tuesday & Friday – 8 a.m. to 4 p.m. Wednesday, Thursday – 7 a.m. to 3 p.m. those potential risks and we strive to make you healthier and to improve your quality of life.     Referrals, and Radiology Information:    If your insurance requires a referral to a specialist, please allow 5 business days to process your referral request.

## 2020-07-08 ENCOUNTER — APPOINTMENT (OUTPATIENT)
Dept: INTERVENTIONAL RADIOLOGY/VASCULAR | Facility: HOSPITAL | Age: 75
DRG: 247 | End: 2020-07-08
Attending: INTERNAL MEDICINE
Payer: MEDICARE

## 2020-07-08 ENCOUNTER — APPOINTMENT (OUTPATIENT)
Dept: CV DIAGNOSTICS | Facility: HOSPITAL | Age: 75
DRG: 247 | End: 2020-07-08
Attending: EMERGENCY MEDICINE
Payer: MEDICARE

## 2020-07-08 ENCOUNTER — APPOINTMENT (OUTPATIENT)
Dept: GENERAL RADIOLOGY | Facility: HOSPITAL | Age: 75
DRG: 247 | End: 2020-07-08
Attending: HOSPITALIST
Payer: MEDICARE

## 2020-07-08 ENCOUNTER — APPOINTMENT (OUTPATIENT)
Dept: GENERAL RADIOLOGY | Facility: HOSPITAL | Age: 75
DRG: 247 | End: 2020-07-08
Attending: EMERGENCY MEDICINE
Payer: MEDICARE

## 2020-07-08 ENCOUNTER — APPOINTMENT (OUTPATIENT)
Dept: CT IMAGING | Facility: HOSPITAL | Age: 75
DRG: 247 | End: 2020-07-08
Attending: EMERGENCY MEDICINE
Payer: MEDICARE

## 2020-07-08 ENCOUNTER — HOSPITAL ENCOUNTER (INPATIENT)
Facility: HOSPITAL | Age: 75
LOS: 2 days | Discharge: NURSING, REHAB OR LONG-TERM CARE | DRG: 247 | End: 2020-07-10
Attending: EMERGENCY MEDICINE | Admitting: HOSPITALIST
Payer: MEDICARE

## 2020-07-08 DIAGNOSIS — E11.9 DIABETES MELLITUS TYPE 2 IN NONOBESE (HCC): ICD-10-CM

## 2020-07-08 DIAGNOSIS — R77.8 ELEVATED TROPONIN: ICD-10-CM

## 2020-07-08 DIAGNOSIS — S00.03XA CONTUSION OF SCALP, INITIAL ENCOUNTER: ICD-10-CM

## 2020-07-08 DIAGNOSIS — T14.8XXA SUPERFICIAL LACERATION: ICD-10-CM

## 2020-07-08 DIAGNOSIS — R94.31 ABNORMAL EKG: ICD-10-CM

## 2020-07-08 DIAGNOSIS — I50.20 HEART FAILURE WITH REDUCED EJECTION FRACTION (HCC): ICD-10-CM

## 2020-07-08 DIAGNOSIS — I21.4 NSTEMI (NON-ST ELEVATED MYOCARDIAL INFARCTION) (HCC): ICD-10-CM

## 2020-07-08 DIAGNOSIS — R73.9 HYPERGLYCEMIA: ICD-10-CM

## 2020-07-08 DIAGNOSIS — R55 PRE-SYNCOPE: Primary | ICD-10-CM

## 2020-07-08 PROBLEM — R79.89 AZOTEMIA: Status: ACTIVE | Noted: 2020-07-08

## 2020-07-08 PROBLEM — R79.89 ELEVATED TROPONIN: Status: ACTIVE | Noted: 2020-07-08

## 2020-07-08 LAB
ALBUMIN SERPL-MCNC: 3.8 G/DL (ref 3.4–5)
ALBUMIN/GLOB SERPL: 1.1 {RATIO} (ref 1–2)
ALP LIVER SERPL-CCNC: 96 U/L (ref 45–117)
ALT SERPL-CCNC: 55 U/L (ref 16–61)
ANION GAP SERPL CALC-SCNC: 4 MMOL/L (ref 0–18)
APTT PPP: 27.7 SECONDS (ref 25.4–36.1)
AST SERPL-CCNC: 45 U/L (ref 15–37)
BASOPHILS # BLD AUTO: 0.03 X10(3) UL (ref 0–0.2)
BASOPHILS NFR BLD AUTO: 0.5 %
BILIRUB SERPL-MCNC: 0.3 MG/DL (ref 0.1–2)
BUN BLD-MCNC: 34 MG/DL (ref 7–18)
BUN/CREAT SERPL: 33 (ref 10–20)
CALCIUM BLD-MCNC: 9.2 MG/DL (ref 8.5–10.1)
CHLORIDE SERPL-SCNC: 110 MMOL/L (ref 98–112)
CHOLEST SMN-MCNC: 136 MG/DL (ref ?–200)
CO2 SERPL-SCNC: 27 MMOL/L (ref 21–32)
CREAT BLD-MCNC: 1.03 MG/DL (ref 0.7–1.3)
DEPRECATED RDW RBC AUTO: 47.1 FL (ref 35.1–46.3)
EOSINOPHIL # BLD AUTO: 0.1 X10(3) UL (ref 0–0.7)
EOSINOPHIL NFR BLD AUTO: 1.6 %
ERYTHROCYTE [DISTWIDTH] IN BLOOD BY AUTOMATED COUNT: 13.9 % (ref 11–15)
EST. AVERAGE GLUCOSE BLD GHB EST-MCNC: 154 MG/DL (ref 68–126)
GLOBULIN PLAS-MCNC: 3.4 G/DL (ref 2.8–4.4)
GLUCOSE BLD-MCNC: 141 MG/DL (ref 70–99)
HBA1C MFR BLD HPLC: 7 % (ref ?–5.7)
HCT VFR BLD AUTO: 40.6 % (ref 39–53)
HDLC SERPL-MCNC: 44 MG/DL (ref 40–59)
HGB BLD-MCNC: 13.5 G/DL (ref 13–17.5)
IMM GRANULOCYTES # BLD AUTO: 0.02 X10(3) UL (ref 0–1)
IMM GRANULOCYTES NFR BLD: 0.3 %
INR BLD: 1.02 (ref 0.89–1.11)
LDLC SERPL CALC-MCNC: 55 MG/DL (ref ?–100)
LYMPHOCYTES # BLD AUTO: 1.59 X10(3) UL (ref 1–4)
LYMPHOCYTES NFR BLD AUTO: 25 %
M PROTEIN MFR SERPL ELPH: 7.2 G/DL (ref 6.4–8.2)
MCH RBC QN AUTO: 30.7 PG (ref 26–34)
MCHC RBC AUTO-ENTMCNC: 33.3 G/DL (ref 31–37)
MCV RBC AUTO: 92.3 FL (ref 80–100)
MONOCYTES # BLD AUTO: 0.48 X10(3) UL (ref 0.1–1)
MONOCYTES NFR BLD AUTO: 7.5 %
NEUTROPHILS # BLD AUTO: 4.15 X10 (3) UL (ref 1.5–7.7)
NEUTROPHILS # BLD AUTO: 4.15 X10(3) UL (ref 1.5–7.7)
NEUTROPHILS NFR BLD AUTO: 65.1 %
NONHDLC SERPL-MCNC: 92 MG/DL (ref ?–130)
OSMOLALITY SERPL CALC.SUM OF ELEC: 302 MOSM/KG (ref 275–295)
PLATELET # BLD AUTO: 148 10(3)UL (ref 150–450)
POTASSIUM SERPL-SCNC: 4.4 MMOL/L (ref 3.5–5.1)
PSA SERPL DL<=0.01 NG/ML-MCNC: 13.7 SECONDS (ref 12.4–14.6)
RBC # BLD AUTO: 4.4 X10(6)UL (ref 3.8–5.8)
SARS-COV-2 RNA RESP QL NAA+PROBE: NOT DETECTED
SODIUM SERPL-SCNC: 141 MMOL/L (ref 136–145)
TRIGL SERPL-MCNC: 185 MG/DL (ref 30–149)
TROPONIN I SERPL-MCNC: 1.51 NG/ML (ref ?–0.04)
VLDLC SERPL CALC-MCNC: 37 MG/DL (ref 0–30)
WBC # BLD AUTO: 6.4 X10(3) UL (ref 4–11)

## 2020-07-08 PROCEDURE — 99223 1ST HOSP IP/OBS HIGH 75: CPT | Performed by: INTERNAL MEDICINE

## 2020-07-08 PROCEDURE — B2151ZZ FLUOROSCOPY OF LEFT HEART USING LOW OSMOLAR CONTRAST: ICD-10-PCS | Performed by: INTERNAL MEDICINE

## 2020-07-08 PROCEDURE — 99152 MOD SED SAME PHYS/QHP 5/>YRS: CPT | Performed by: INTERNAL MEDICINE

## 2020-07-08 PROCEDURE — 93306 TTE W/DOPPLER COMPLETE: CPT | Performed by: EMERGENCY MEDICINE

## 2020-07-08 PROCEDURE — 027034Z DILATION OF CORONARY ARTERY, ONE ARTERY WITH DRUG-ELUTING INTRALUMINAL DEVICE, PERCUTANEOUS APPROACH: ICD-10-PCS | Performed by: INTERNAL MEDICINE

## 2020-07-08 PROCEDURE — 4A023N7 MEASUREMENT OF CARDIAC SAMPLING AND PRESSURE, LEFT HEART, PERCUTANEOUS APPROACH: ICD-10-PCS | Performed by: INTERNAL MEDICINE

## 2020-07-08 PROCEDURE — B240ZZ3 ULTRASONOGRAPHY OF SINGLE CORONARY ARTERY, INTRAVASCULAR: ICD-10-PCS | Performed by: INTERNAL MEDICINE

## 2020-07-08 PROCEDURE — 3E0234Z INTRODUCTION OF SERUM, TOXOID AND VACCINE INTO MUSCLE, PERCUTANEOUS APPROACH: ICD-10-PCS | Performed by: EMERGENCY MEDICINE

## 2020-07-08 PROCEDURE — 70450 CT HEAD/BRAIN W/O DYE: CPT | Performed by: EMERGENCY MEDICINE

## 2020-07-08 PROCEDURE — 92978 ENDOLUMINL IVUS OCT C 1ST: CPT | Performed by: INTERNAL MEDICINE

## 2020-07-08 PROCEDURE — 93458 L HRT ARTERY/VENTRICLE ANGIO: CPT | Performed by: INTERNAL MEDICINE

## 2020-07-08 PROCEDURE — B2111ZZ FLUOROSCOPY OF MULTIPLE CORONARY ARTERIES USING LOW OSMOLAR CONTRAST: ICD-10-PCS | Performed by: INTERNAL MEDICINE

## 2020-07-08 PROCEDURE — 73030 X-RAY EXAM OF SHOULDER: CPT | Performed by: HOSPITALIST

## 2020-07-08 PROCEDURE — 99223 1ST HOSP IP/OBS HIGH 75: CPT | Performed by: HOSPITALIST

## 2020-07-08 PROCEDURE — 92928 PRQ TCAT PLMT NTRAC ST 1 LES: CPT | Performed by: INTERNAL MEDICINE

## 2020-07-08 RX ORDER — BIVALIRUDIN 250 MG/5ML
INJECTION, POWDER, LYOPHILIZED, FOR SOLUTION INTRAVENOUS
Status: COMPLETED
Start: 2020-07-08 | End: 2020-07-08

## 2020-07-08 RX ORDER — SODIUM CHLORIDE 9 MG/ML
INJECTION, SOLUTION INTRAVENOUS CONTINUOUS
Status: ACTIVE | OUTPATIENT
Start: 2020-07-08 | End: 2020-07-08

## 2020-07-08 RX ORDER — ACETAMINOPHEN 325 MG/1
650 TABLET ORAL EVERY 4 HOURS PRN
Status: DISCONTINUED | OUTPATIENT
Start: 2020-07-08 | End: 2020-07-10

## 2020-07-08 RX ORDER — SODIUM CHLORIDE 9 MG/ML
INJECTION, SOLUTION INTRAVENOUS CONTINUOUS
Status: DISCONTINUED | OUTPATIENT
Start: 2020-07-08 | End: 2020-07-09

## 2020-07-08 RX ORDER — ENOXAPARIN SODIUM 100 MG/ML
40 INJECTION SUBCUTANEOUS NIGHTLY
Status: DISCONTINUED | OUTPATIENT
Start: 2020-07-09 | End: 2020-07-10

## 2020-07-08 RX ORDER — MIDAZOLAM HYDROCHLORIDE 1 MG/ML
INJECTION INTRAMUSCULAR; INTRAVENOUS
Status: COMPLETED
Start: 2020-07-08 | End: 2020-07-08

## 2020-07-08 RX ORDER — PRASUGREL 10 MG/1
TABLET, FILM COATED ORAL
Status: COMPLETED
Start: 2020-07-08 | End: 2020-07-08

## 2020-07-08 RX ORDER — HEPARIN SODIUM 5000 [USP'U]/ML
INJECTION, SOLUTION INTRAVENOUS; SUBCUTANEOUS
Status: COMPLETED
Start: 2020-07-08 | End: 2020-07-08

## 2020-07-08 RX ORDER — ACETAMINOPHEN 325 MG/1
650 TABLET ORAL EVERY 6 HOURS PRN
Status: DISCONTINUED | OUTPATIENT
Start: 2020-07-08 | End: 2020-07-08

## 2020-07-08 RX ORDER — ONDANSETRON 2 MG/ML
4 INJECTION INTRAMUSCULAR; INTRAVENOUS EVERY 6 HOURS PRN
Status: DISCONTINUED | OUTPATIENT
Start: 2020-07-08 | End: 2020-07-10

## 2020-07-08 RX ORDER — PRASUGREL 10 MG/1
10 TABLET, FILM COATED ORAL DAILY
Status: DISCONTINUED | OUTPATIENT
Start: 2020-07-09 | End: 2020-07-10

## 2020-07-08 RX ORDER — ACETAMINOPHEN AND CODEINE PHOSPHATE 300; 30 MG/1; MG/1
2 TABLET ORAL EVERY 4 HOURS PRN
Status: DISCONTINUED | OUTPATIENT
Start: 2020-07-08 | End: 2020-07-10

## 2020-07-08 RX ORDER — ACETAMINOPHEN AND CODEINE PHOSPHATE 300; 30 MG/1; MG/1
1 TABLET ORAL EVERY 4 HOURS PRN
Status: DISCONTINUED | OUTPATIENT
Start: 2020-07-08 | End: 2020-07-10

## 2020-07-08 RX ORDER — ASPIRIN 81 MG/1
324 TABLET, CHEWABLE ORAL ONCE
Status: COMPLETED | OUTPATIENT
Start: 2020-07-08 | End: 2020-07-08

## 2020-07-08 RX ORDER — ATORVASTATIN CALCIUM 40 MG/1
40 TABLET, FILM COATED ORAL NIGHTLY
Status: DISCONTINUED | OUTPATIENT
Start: 2020-07-08 | End: 2020-07-09

## 2020-07-08 RX ORDER — ASPIRIN 81 MG/1
81 TABLET ORAL DAILY
Status: DISCONTINUED | OUTPATIENT
Start: 2020-07-09 | End: 2020-07-10

## 2020-07-08 RX ORDER — ROSUVASTATIN CALCIUM 10 MG/1
10 TABLET, COATED ORAL DAILY
Status: ON HOLD | COMMUNITY
End: 2020-07-10

## 2020-07-08 RX ORDER — LIDOCAINE HYDROCHLORIDE 10 MG/ML
INJECTION, SOLUTION EPIDURAL; INFILTRATION; INTRACAUDAL; PERINEURAL
Status: COMPLETED
Start: 2020-07-08 | End: 2020-07-08

## 2020-07-08 RX ORDER — LISINOPRIL 10 MG/1
10 TABLET ORAL DAILY
Status: ON HOLD | COMMUNITY
End: 2020-07-10

## 2020-07-08 NOTE — ED PROVIDER NOTES
Patient Seen in: [facility] Emergency Department      History   Patient presents with:  Fall  Trauma  Upper Extremity Injury    Stated Complaint: fall    HPI    Patient is a 19-year-old male who was at home and got up to go to the bathroom.   Patient distress. HEENT: Extraocular muscles intact, pupils equal round reactive to light and accommodation. Mouth normal, neck supple, no meningismus. Half centimeter laceration to top of head. Patient nontender. Neck nontender.   LUNGS: Lungs clear to auscul Please view results for these tests on the individual orders. RAINBOW DRAW BLUE   RAINBOW DRAW LAVENDER   RAINBOW DRAW LIGHT GREEN   RAINBOW DRAW GOLD     EKG    Rate, intervals and axes as noted on EKG Report.   Rate: 62  Rhythm: Sinus Rhythm  Readin pm    Follow-up:  No follow-up provider specified.         Medications Prescribed:  Current Discharge Medication List                       Present on Admission  Date Reviewed: 7/6/2020          ICD-10-CM Noted POA    * (Principal) Pre-syncope R55 7/8/2020

## 2020-07-08 NOTE — ED INITIAL ASSESSMENT (HPI)
Pt states he was in a wheeled office chair and he wheeled himself to the bathroom to go to the bathroom and \"felt weird\". Pt states he lost his balance and fell through the shower.  Pt is unable to raise up his L shoulder and has a laceration to the L trey

## 2020-07-08 NOTE — CONSULTS
[facility] [[email or web address]] Cardiology  Report of Consultation    [facility] Patient Status:  Emergency    11/10/1945 MRN [id number]   Location [address] [facility] Attending Mckenna Mendez MD   Hosp Day # 0 PCP MD Sabrina Craig nasal congestion. Cardiovascular: see HPI -no chest symptoms. No anginal or congestive-like symptoms but mechanical fall. Respiratory: No cough, wheezing or hemoptysis, no dyspnea. Hematologic/Lymphatic: No easy bruising or bleeding.    Integumentary: N and leg weakness and ataxia, chronic. Uses walker for ambulation. Musculoskeletal: normal range of motion, normal muscle strength, no joint effusion. Chronic leg weakness with ataxia and balance issues and uses walker and cane for ambulation.   Integumen multilevel disc dx with chronic deformities, balance issues, and rec dizziness, evaluated by neurology few times in the past, presented to ER post mechanical fall under shower. H/o fall and knee and head injury. He uses a walker.  Advanced cerebral atrophy

## 2020-07-09 ENCOUNTER — APPOINTMENT (OUTPATIENT)
Dept: GENERAL RADIOLOGY | Facility: HOSPITAL | Age: 75
DRG: 247 | End: 2020-07-09
Attending: INTERNAL MEDICINE
Payer: MEDICARE

## 2020-07-09 LAB
ANION GAP SERPL CALC-SCNC: 1 MMOL/L (ref 0–18)
ATRIAL RATE: 51 BPM
ATRIAL RATE: 62 BPM
ATRIAL RATE: 67 BPM
BUN BLD-MCNC: 29 MG/DL (ref 7–18)
BUN/CREAT SERPL: 33.3 (ref 10–20)
CALCIUM BLD-MCNC: 8.5 MG/DL (ref 8.5–10.1)
CHLORIDE SERPL-SCNC: 111 MMOL/L (ref 98–112)
CHOLEST SMN-MCNC: 119 MG/DL (ref ?–200)
CO2 SERPL-SCNC: 29 MMOL/L (ref 21–32)
CREAT BLD-MCNC: 0.87 MG/DL (ref 0.7–1.3)
DEPRECATED RDW RBC AUTO: 48.2 FL (ref 35.1–46.3)
ERYTHROCYTE [DISTWIDTH] IN BLOOD BY AUTOMATED COUNT: 14.1 % (ref 11–15)
EST. AVERAGE GLUCOSE BLD GHB EST-MCNC: 157 MG/DL (ref 68–126)
GLUCOSE BLD-MCNC: 115 MG/DL (ref 70–99)
GLUCOSE BLD-MCNC: 128 MG/DL (ref 70–99)
GLUCOSE BLD-MCNC: 130 MG/DL (ref 70–99)
GLUCOSE BLD-MCNC: 145 MG/DL (ref 70–99)
GLUCOSE BLD-MCNC: 180 MG/DL (ref 70–99)
HBA1C MFR BLD HPLC: 7.1 % (ref ?–5.7)
HCT VFR BLD AUTO: 34.1 % (ref 39–53)
HDLC SERPL-MCNC: 41 MG/DL (ref 40–59)
HGB BLD-MCNC: 11.1 G/DL (ref 13–17.5)
LDLC SERPL CALC-MCNC: 56 MG/DL (ref ?–100)
MCH RBC QN AUTO: 30.4 PG (ref 26–34)
MCHC RBC AUTO-ENTMCNC: 32.6 G/DL (ref 31–37)
MCV RBC AUTO: 93.4 FL (ref 80–100)
NONHDLC SERPL-MCNC: 78 MG/DL (ref ?–130)
OSMOLALITY SERPL CALC.SUM OF ELEC: 299 MOSM/KG (ref 275–295)
P AXIS: 57 DEGREES
P AXIS: 63 DEGREES
P AXIS: 66 DEGREES
P-R INTERVAL: 114 MS
P-R INTERVAL: 144 MS
P-R INTERVAL: 154 MS
PLATELET # BLD AUTO: 127 10(3)UL (ref 150–450)
POTASSIUM SERPL-SCNC: 4.2 MMOL/L (ref 3.5–5.1)
Q-T INTERVAL: 464 MS
Q-T INTERVAL: 468 MS
Q-T INTERVAL: 558 MS
QRS DURATION: 78 MS
QRS DURATION: 86 MS
QRS DURATION: 90 MS
QTC CALCULATION (BEZET): 475 MS
QTC CALCULATION (BEZET): 490 MS
QTC CALCULATION (BEZET): 514 MS
R AXIS: 21 DEGREES
R AXIS: 34 DEGREES
R AXIS: 43 DEGREES
RBC # BLD AUTO: 3.65 X10(6)UL (ref 3.8–5.8)
SODIUM SERPL-SCNC: 141 MMOL/L (ref 136–145)
T AXIS: 20 DEGREES
T AXIS: 220 DEGREES
T AXIS: 72 DEGREES
T4 FREE SERPL-MCNC: 1 NG/DL (ref 0.8–1.7)
TRIGL SERPL-MCNC: 112 MG/DL (ref 30–149)
TSI SER-ACNC: 1.24 MIU/ML (ref 0.36–3.74)
VENTRICULAR RATE: 51 BPM
VENTRICULAR RATE: 62 BPM
VENTRICULAR RATE: 67 BPM
VLDLC SERPL CALC-MCNC: 22 MG/DL (ref 0–30)
WBC # BLD AUTO: 7.5 X10(3) UL (ref 4–11)

## 2020-07-09 PROCEDURE — 99233 SBSQ HOSP IP/OBS HIGH 50: CPT | Performed by: INTERNAL MEDICINE

## 2020-07-09 PROCEDURE — 71045 X-RAY EXAM CHEST 1 VIEW: CPT | Performed by: INTERNAL MEDICINE

## 2020-07-09 PROCEDURE — 99233 SBSQ HOSP IP/OBS HIGH 50: CPT | Performed by: CLINICAL NURSE SPECIALIST

## 2020-07-09 PROCEDURE — 99232 SBSQ HOSP IP/OBS MODERATE 35: CPT | Performed by: HOSPITALIST

## 2020-07-09 RX ORDER — LISINOPRIL 5 MG/1
5 TABLET ORAL DAILY
Status: DISCONTINUED | OUTPATIENT
Start: 2020-07-10 | End: 2020-07-10

## 2020-07-09 RX ORDER — ALLOPURINOL 100 MG/1
100 TABLET ORAL 2 TIMES DAILY
Status: DISCONTINUED | OUTPATIENT
Start: 2020-07-09 | End: 2020-07-10

## 2020-07-09 RX ORDER — LISINOPRIL 10 MG/1
10 TABLET ORAL DAILY
Status: DISCONTINUED | OUTPATIENT
Start: 2020-07-09 | End: 2020-07-09

## 2020-07-09 RX ORDER — ROSUVASTATIN CALCIUM 20 MG/1
20 TABLET, COATED ORAL NIGHTLY
Status: DISCONTINUED | OUTPATIENT
Start: 2020-07-09 | End: 2020-07-10

## 2020-07-09 RX ORDER — PRASUGREL 10 MG/1
10 TABLET, FILM COATED ORAL DAILY
Qty: 30 TABLET | Refills: 5 | Status: SHIPPED | OUTPATIENT
Start: 2020-07-10

## 2020-07-09 RX ORDER — DEXTROSE MONOHYDRATE 25 G/50ML
50 INJECTION, SOLUTION INTRAVENOUS
Status: DISCONTINUED | OUTPATIENT
Start: 2020-07-09 | End: 2020-07-10

## 2020-07-09 RX ORDER — SPIRONOLACTONE 25 MG/1
12.5 TABLET ORAL DAILY
Status: DISCONTINUED | OUTPATIENT
Start: 2020-07-09 | End: 2020-07-10

## 2020-07-09 NOTE — CONSULTS
[facility]  Diabetes Consult Note    Zulma Storm Patient Status:  Inpatient    11/10/1945 MRN [id number]   [facility] 8NE-A Attending Moraima Wagner MD   Hosp Day # 1 PCP Deepti Holliday MD     Reason for Consult:     Recommendatio with new onset type 2 diabetes admitted 7/8/2020 for fall - left shoulder pain.       Assessment/Recommendations:    During this admission the patient was diagnosed with Takotsubo's cardiomyopathy and NSTEMI s/p SARAH to LAD and new onset type 2 diabetes, A1C could afford to pay for them out of pocket and would like to try this. Recommended a trial wearing a CGM and he agreed. Due to minimal insulin requirements, would recommend diet only at this time.   Once he has blood glucose monitor ability, oral medic

## 2020-07-09 NOTE — CONSULTS
Clinical Nutrition  Dietitian consult received per cardiac rehab standing order. Pt to be educated by cardiac rehab staff and encouraged to attend outpatient classes taught by BLANCA. BLANCA available PRN.      Thania Champion, MS, RD, LDN  Clinical Dietitian  Pager

## 2020-07-09 NOTE — PROGRESS NOTES
[facility] Cardiology  Progress Note    Emery Crimes Patient Status:  Inpatient    11/10/1945 MRN [id number]   [facility] 8NE-A Attending Luisa Childers MD   Hosp Day # 1 PCP Margret Cochran MD     Subjective: Patient denies Peripheral pulses are 2+. Neurologic: Alert and oriented x3. Cranial nerves intact. Normal sensation and motor function. No focal deficits. Musculoskeletal: normal range of motion, normal muscle strength, no joint effusion.    Integumentary: Warm and dry leg strengthening exercises 10/19 and started having pain in the posterior aspect of his right shoulder.     FINDINGS:  Severe glenohumeral arthropathy with severe narrowing of the joint space, bone-on-bone appearance especially along the inferior aspect of Advanced but stable appearing cerebellar atrophy. More modest cerebral atrophy, showing some increase in the amount of cerebral atrophy since 2017.    Dictated by: Augusta Gunter MD on 7/08/2020 at 5:10 PM     Finalized by: Augusta Gunter MD on 7/08/2020 with no acute stroke or bleeding. Advanced cerebral atrophy and mild to moderate cerebral atrophy, advanced since 2017.  2.  Takotsubo syndrome/stress cardiomyopathy -new finding with no history of heart failure. LVEF 25 to 30%.    3.  Status post PCI of low blood pressures since I like to add spironolactone 12.5 mg daily for his heart failure with reduced ejection fraction  -Follow blood pressure and metabolic panel with potassium level in a.m.  -Cardiac rehab to visit the patient post PCI but he will not

## 2020-07-09 NOTE — H&P
[facility]  History and Physical     Toy Juarez Patient Status:  Inpatient    11/10/1945 MRN [id number]   [facility] 8NE-A Attending Jacquelyn Mabry MD   Hosp Day # 0 PCP Vandana Colvin MD     Chief Complaint: fall    History (DAILY VITAMINS) Oral Tab, Take 1 tablet by mouth daily. , Disp: , Rfl:         Review of Systems:   A comprehensive 14 point review of systems was completed. Pertinent positives and negatives noted in the HPI.     Physical Exam:    /45   Pulse (!) xray  3. Reduced EF; defer timing of acei and BB to cards; hold off tonight unless otherwise stated by cards  4.  Hx gout  5. htn    Will do med rec once review complete    Quality:  · DVT Prophylaxis: lovenox  · CODE status: full  · Avila: no    Plan of ca

## 2020-07-09 NOTE — PROCEDURES
Full procedure was dictated.     Procedure performed:  Selective coronary angiography  LV gram  PCI and 80% proximal LAD stenosis with drug-coated stent Xience Urmila 3 x 15 mm  Intracoronary ultrasound of LAD to guide PCI    Indications:  -Acute MI, possib lipids  -Follow metabolic panel in the morning  -Discharge planning 2 to 3 days  -Since patient is doing relatively well and with bed issues will send the patient to telemetry -patient is stable hemodynamically and no chest symptoms  -Right shoulder x-ray

## 2020-07-09 NOTE — PROCEDURES
[facility]    PATIENT'S NAME: Luis Eduardo Robison   ATTENDING PHYSICIAN: Roro Henderson M.D. OPERATING PHYSICIAN: Pooja Santos M.D.    PATIENT ACCOUNT#:   [de-identified]    LOCATION:  [address] [place] [address]  MEDICAL RECORD #:   [id number]       DATE OF BIRTH: He was prepped and draped in the usual sterile fashion. Lidocaine 1% was used to infiltrate his right groin for local anesthesia. A 6-Chilean introducer sheath was inserted into the right femoral artery using modified Seldinger technique.   A selective cor using a Trek balloon 2.0 x 12 mm, and then we performed intracoronary ultrasound for better stent sizing since there was some diffuse disease proximal to the blockage near the major septal .   We were trying to avoid jailing this septal  cardiomyopathy with 80% proximal left anterior descending disease.     2.   An 80% proximal left anterior descending disease with otherwise no significant coronary artery disease coronary angiographically, with dominant left circumflex artery as described a Follow metabolic panel in the morning. 10.   Discharge planned in 2 to 3 days. 11.   Cardiac rehab to see the patient.    12.   Since the patient is doing relatively well and with hospital bed issues, we will be able to send the patient to telemetry si

## 2020-07-09 NOTE — PLAN OF CARE
Assumed care of pt at 1930 a/o x4 but drowsy from recent cath. IVF infusing 0.9NS at 75cc/hr with angiomax which was d/c at 2120. R groin angioseal c/d/I and pulses 2+ with normal sensation and are discolored d/t disease.   He had some c/o back pain towar if indicated  - Evaluate effectiveness of antiarrhythmic and heart rate control medications as ordered  - Initiate emergency measures for life threatening arrhythmias  - Monitor electrolytes and administer replacement therapy as ordered  Outcome: Progressi

## 2020-07-09 NOTE — PLAN OF CARE
Received patient at 0730. Alert and orientated x4. Tele Rhythm Sinus Sharl Parvin. Metoprolol held this AM. O2 saturation 100% on RA. Breath sounds diminished. No C/O chest pain, SOB, or dizziness. Pt voiding with no issues.  Pt up with 1 assist and walker to olea isolation precautions as appropriate  - Initiate Pressure Ulcer prevention bundle as indicated  Outcome: Progressing

## 2020-07-09 NOTE — CARDIAC REHAB
Cardiac rehab education initiated with patient  Stent card given to patient  Patient referred to cardiac rehab.

## 2020-07-09 NOTE — PROGRESS NOTES
[facility]  Progress Note     Femi Boswell Patient Status:  Inpatient    11/10/1945 MRN [id number]   [facility] 8NE-A Attending Jeanette Pelletier MD   Hosp Day # 1 PCP Sravani Cohen MD     Chief Complaint: fall  S:  Shoulder pain Subcutaneous Nightly     ASSESSMENT / PLAN:   1. Takotsubo's cardiomyopathy and NSTEMI-s/p SARAH to LAD  1. ECHO EF 35%  2. ASA/Effient per cards  3. BB held for bradycardia   4. Statin     2. Elevated A1c of 7 suspecting newly dx DM  1. DM education   2.  St

## 2020-07-10 VITALS
SYSTOLIC BLOOD PRESSURE: 139 MMHG | HEIGHT: 68 IN | TEMPERATURE: 98 F | BODY MASS INDEX: 22.43 KG/M2 | HEART RATE: 62 BPM | OXYGEN SATURATION: 100 % | RESPIRATION RATE: 20 BRPM | DIASTOLIC BLOOD PRESSURE: 72 MMHG | WEIGHT: 148 LBS

## 2020-07-10 DIAGNOSIS — M25.511 CHRONIC RIGHT SHOULDER PAIN: Primary | ICD-10-CM

## 2020-07-10 DIAGNOSIS — G89.29 CHRONIC RIGHT SHOULDER PAIN: Primary | ICD-10-CM

## 2020-07-10 LAB
ANION GAP SERPL CALC-SCNC: 2 MMOL/L (ref 0–18)
ATRIAL RATE: 56 BPM
BUN BLD-MCNC: 25 MG/DL (ref 7–18)
BUN/CREAT SERPL: 24.3 (ref 10–20)
CALCIUM BLD-MCNC: 9.3 MG/DL (ref 8.5–10.1)
CHLORIDE SERPL-SCNC: 106 MMOL/L (ref 98–112)
CO2 SERPL-SCNC: 30 MMOL/L (ref 21–32)
CREAT BLD-MCNC: 1.03 MG/DL (ref 0.7–1.3)
GLUCOSE BLD-MCNC: 110 MG/DL (ref 70–99)
GLUCOSE BLD-MCNC: 122 MG/DL (ref 70–99)
GLUCOSE BLD-MCNC: 129 MG/DL (ref 70–99)
GLUCOSE BLD-MCNC: 134 MG/DL (ref 70–99)
OSMOLALITY SERPL CALC.SUM OF ELEC: 292 MOSM/KG (ref 275–295)
P AXIS: 62 DEGREES
P-R INTERVAL: 146 MS
POTASSIUM SERPL-SCNC: 4.4 MMOL/L (ref 3.5–5.1)
Q-T INTERVAL: 500 MS
QRS DURATION: 84 MS
QTC CALCULATION (BEZET): 482 MS
R AXIS: 52 DEGREES
SARS-COV-2 RNA RESP QL NAA+PROBE: NOT DETECTED
SODIUM SERPL-SCNC: 138 MMOL/L (ref 136–145)
T AXIS: 238 DEGREES
VENTRICULAR RATE: 56 BPM

## 2020-07-10 PROCEDURE — 99233 SBSQ HOSP IP/OBS HIGH 50: CPT | Performed by: INTERNAL MEDICINE

## 2020-07-10 PROCEDURE — 99239 HOSP IP/OBS DSCHRG MGMT >30: CPT | Performed by: HOSPITALIST

## 2020-07-10 PROCEDURE — 99231 SBSQ HOSP IP/OBS SF/LOW 25: CPT | Performed by: CLINICAL NURSE SPECIALIST

## 2020-07-10 RX ORDER — ASPIRIN 81 MG/1
81 TABLET ORAL DAILY
Qty: 30 TABLET | Refills: 3 | Status: SHIPPED | OUTPATIENT
Start: 2020-07-11

## 2020-07-10 RX ORDER — BLOOD-GLUCOSE METER
EACH MISCELLANEOUS
Qty: 1 KIT | Refills: 0 | Status: SHIPPED | OUTPATIENT
Start: 2020-07-10 | End: 2021-05-24 | Stop reason: ALTCHOICE

## 2020-07-10 RX ORDER — SPIRONOLACTONE 25 MG/1
12.5 TABLET ORAL DAILY
Qty: 30 TABLET | Refills: 3 | Status: SHIPPED | OUTPATIENT
Start: 2020-07-11

## 2020-07-10 RX ORDER — LISINOPRIL 10 MG/1
5 TABLET ORAL DAILY
Refills: 0 | Status: SHIPPED | COMMUNITY
Start: 2020-07-10 | End: 2020-08-20

## 2020-07-10 RX ORDER — BLOOD SUGAR DIAGNOSTIC
STRIP MISCELLANEOUS
Qty: 50 STRIP | Refills: 6 | Status: SHIPPED | OUTPATIENT
Start: 2020-07-10 | End: 2021-05-24

## 2020-07-10 RX ORDER — FLASH GLUCOSE SENSOR
KIT MISCELLANEOUS
Qty: 1 DEVICE | Refills: 0 | Status: SHIPPED | OUTPATIENT
Start: 2020-07-10 | End: 2021-05-24

## 2020-07-10 RX ORDER — ROSUVASTATIN CALCIUM 20 MG/1
20 TABLET, COATED ORAL NIGHTLY
Qty: 30 TABLET | Refills: 3 | Status: SHIPPED | OUTPATIENT
Start: 2020-07-10 | End: 2020-09-17

## 2020-07-10 RX ORDER — FLASH GLUCOSE SENSOR
KIT MISCELLANEOUS
Qty: 2 EACH | Refills: 2 | Status: SHIPPED | OUTPATIENT
Start: 2020-07-10 | End: 2020-07-28

## 2020-07-10 NOTE — CM/SW NOTE
07/10/20 1200   CM/SW Referral Data   Referral Source Social Work (self-referral)   Reason for Referral Discharge planning   Informant Patient   Patient Info   Patient's Mental Status Alert;Oriented   Patient's [address]   Number of Levels the SANGITA process and that there are many facilities around the [facility] area. Pt has no hx of SANGITA. JULIA explained the Aidin referral process. JULIA will provide pt with a list of accepting facilities later on today. DON was requested.     JULIA will continue t

## 2020-07-10 NOTE — PHYSICAL THERAPY NOTE
PHYSICAL THERAPY EVALUATION - INPATIENT     Room Number: 2631/1140-N  Evaluation Date: 7/10/2020  Type of Evaluation: Initial  Physician Order: PT Eval and Treat    Presenting Problem: NSTEMI, fall  Reason for Therapy: Mobility Dysfunction and Discha SUBJECTIVE  \"I want to keep my independence\" pt verbalizes frustration and challenging family dynamic, as he feels brother is trying to take away his independence    Patient self-stated goal is go home    OBJECTIVE  Precautions: Cardiac;Bed/chair ala NEUROLOGICAL FINDINGS                      ACTIVITY TOLERANCE                         O2 WALK                  AM-PAC '6-Clicks' INPATIENT SHORT FORM - BASIC MOBILITY  How much difficulty does the patient currently have. ..  -   Turning over in bed (inc set    ASSESSMENT   Patient is a 76year old male admitted on 7/8/2020 for NSTEMI, s/p PCI. Pertinent comorbidities and personal factors impacting therapy include cerebellar ataxia, HTN, DL.   In this PT evaluation, the patient presents with the following

## 2020-07-10 NOTE — PROGRESS NOTES
[facility]  AM-S Cardiology  Progress Note    Roberto Gilbert Patient Status:  Inpatient    11/10/1945 MRN [id number]   [facility] 8NE-A Attending Catia Lyles MD   Hosp Day # 2 PCP Augusta Phillip MD     Subjective:  The patient is effort. Gastrointestinal: Soft, non-tender abdomen. Extremities: Without clubbing, cyanosis or edema. Peripheral pulses are 2+. Neurologic: Alert and oriented x3. Cranial nerves intact. Normal sensation and motor function. No focal deficits.   Musculo doing leg strengthening exercises 10/19 and started having pain in the posterior aspect of his right shoulder.     FINDINGS:  Severe glenohumeral arthropathy with severe narrowing of the joint space, bone-on-bone appearance especially along the inferior asp process. Advanced but stable appearing cerebellar atrophy. More modest cerebral atrophy, showing some increase in the amount of cerebral atrophy since 2017.    Dictated by: Michaela Marcos MD on 7/08/2020 at 5:10 PM     Finalized by: Michaela Marcos MD on and balance issues. No syncope. No prognostically significant arrhythmia on telemetry but stable mild bradycardia all sinus. Troponin 1.51 in ER. Brain CT with no acute stroke or bleeding.   Advanced cerebral atrophy and mild to moderate cerebral atroph disease.     Plan:  -Due to situation at home and status post cardiac procedure with coronary angioplasty with his chronic ambulation problem patient will need subacute rehab -PT evaluation and  help appreciated to set up rehab in [facility] [place]

## 2020-07-10 NOTE — PLAN OF CARE
Assumed care at 1. Pt is A&Ox4, up with stand pivot but otherwise wheelchair bound at home. Denies chest pain and SOB. O2 sats WNL on RA. Lung sounds diminished bilaterally. Pt is NSR on tele, RRR. No cardiac symptoms. Bowel sounds active, last BM 7/7.

## 2020-07-10 NOTE — CARDIAC REHAB
MI/CAD education completed with pt. Offered phase II CR, gave contact info for [facility]. Pt needs to see about transportation.

## 2020-07-10 NOTE — CM/SW NOTE
JULIA provided pt with the SANGITA accepting facility list from [address] [place] [address]. Will follow up with pt later on today on where he would like to go for SANGITA. Pt was appreciative of the information provided to him. Addendum: Pt chose [facility] for [facility].  JULIA called [facility]

## 2020-07-10 NOTE — CM/SW NOTE
Pt started on effient 10 mg daily. Call placed to listed  [phone]--cost per month $141. oo--updated alejandro encarnacion, patient okay with [facility]cmanus

## 2020-07-10 NOTE — PLAN OF CARE
NURSING DISCHARGE NOTE    Discharged Rehab facility via Wheelchair. Accompanied by Support staff  Belongings Taken by patient/family. Patient denies pain, SOB, and dizziness. Clear by all consults to D/C to [facility].  Discharged via wheelc

## 2020-07-10 NOTE — CM/SW NOTE
07/10/20 1600   Discharge disposition   Expected discharge disposition Skilled Nurs   Name of [facility]   Patient is Discharged to a [address] [place] [address] Yes   Discharge transportation [facility]     Pt stable to [facility]

## 2020-07-10 NOTE — PLAN OF CARE
Received patient at 0730. Alert and orientated x4. Tele Rhythm NSR. O2 saturation 97% on RA. Breath sounds diminished. No C/O chest pain, dizziness, or SOB. Pt voiding with no issues, uses urinal and commode. Pt stands to transfer, does not ambulate.  Right Ulcer prevention bundle as indicated  Outcome: Progressing     Problem: Impaired Functional Mobility  Goal: Achieve highest/safest level of mobility/gait  Description  Interventions:  - Assess patient's functional ability and stability  - Promote increasin

## 2020-07-10 NOTE — DIETARY NOTE
[facility]   CLINICAL NUTRITION    Diabetic Diet Education:   Pt chart reviewed d/t DM education consult and elevated hemoglobin A1C of 7.1. Reviewed and provided handouts on carbohydrate counting/label reading.  Discussed the role of CHO/protein and f

## 2020-07-10 NOTE — PROGRESS NOTES
[facility]  Progress Note     Bar Conteh Patient Status:  Inpatient    11/10/1945 MRN [id number]   [facility] 8NE-A Attending Kathryn Marley MD   Hosp Day # 2 PCP Javy Kate MD     Chief Complaint: fall  S:  No overnight • aspirin  81 mg Oral Daily   • enoxaparin  40 mg Subcutaneous Nightly     ASSESSMENT / PLAN:   1. Takotsubo's cardiomyopathy and NSTEMI-s/p SARAH to LAD  1. ECHO EF 35%- repeat as outpt   2. ASA/Effient per cards  3. BB held for bradycardia   4.  Statin

## 2020-07-10 NOTE — PROGRESS NOTES
[facility]  Diabetes Follow Up      Ilene Oakes  [id number]       Patient seen and evaluated; sitting up in chair and states he is feeling better.     Recent Labs   Lab 07/09/20  1208 07/09/20  1654 07/09/20  2053 07/10/20  0740 07/10/20  135 outpatient follow-up. The patient verbalized understanding. Plan:    · FreeStyle Alanna continuous glucose monitor placed on left upper outer arm as a trial.  Prescription for refills provided.    · Discharge to the [facility] for Initial Gema

## 2020-07-11 NOTE — DISCHARGE SUMMARY
[facility]  DISCHARGE SUMMARY     Alva Dyson Patient Status:  Inpatient    11/10/1945 MRN [id number]   [facility] 8NE-A Attending No att. providers found   Hosp Day # 2 PCP Georgia Jennings MD     Date of Admission: 2020  Date the hospital course. He continued to defervesce. He was newly diagnosed with diabetes. Hemoglobin A1c was 7.0. Patient had diabetic education and was advised for diet control and exercise with cardiac rehab.   If blood sugars continue to be uncontrolled take      Take 0.5 tablets (5 mg total) by mouth daily.    Refills:  0     Rosuvastatin Calcium 20 MG Tabs  Commonly known as:  CRESTOR  What changed:    · medication strength  · how much to take  · when to take this      Take 1 tablet (20 mg total) by mout appointment after you are discharged from Rehab    ---------------------------------------------------------------------------------------  PATIENT DISCHARGE INSTRUCTIONS: See electronic chart    Louie Chadwick MD 7/11/2020    Time spent:  > 30 minutes

## 2020-07-24 ENCOUNTER — TELEPHONE (OUTPATIENT)
Dept: FAMILY MEDICINE CLINIC | Facility: CLINIC | Age: 75
End: 2020-07-24

## 2020-07-24 NOTE — TELEPHONE ENCOUNTER
Hilary Mandel from [facility] health calling for verbal. Requesting for provider to sign off on plan of care and face to face. Patient will be discharged tomorrow.

## 2020-07-24 NOTE — TELEPHONE ENCOUNTER
Attempted to reach Rosales Chau with [facility], left message with verbal ok for home health orders and advised pt has face to face scheduled for 7/28/2020.

## 2020-07-28 ENCOUNTER — TELEPHONE (OUTPATIENT)
Dept: FAMILY MEDICINE CLINIC | Facility: CLINIC | Age: 75
End: 2020-07-28

## 2020-07-28 ENCOUNTER — TELEPHONE (OUTPATIENT)
Dept: CARDIOLOGY | Age: 75
End: 2020-07-28

## 2020-07-28 ENCOUNTER — OFFICE VISIT (OUTPATIENT)
Dept: FAMILY MEDICINE CLINIC | Facility: CLINIC | Age: 75
End: 2020-07-28
Payer: MEDICARE

## 2020-07-28 VITALS
OXYGEN SATURATION: 99 % | HEART RATE: 52 BPM | DIASTOLIC BLOOD PRESSURE: 60 MMHG | RESPIRATION RATE: 14 BRPM | SYSTOLIC BLOOD PRESSURE: 120 MMHG | TEMPERATURE: 97 F

## 2020-07-28 DIAGNOSIS — E11.9 DIABETES MELLITUS TYPE 2 IN NONOBESE (HCC): ICD-10-CM

## 2020-07-28 DIAGNOSIS — I21.4 NSTEMI (NON-ST ELEVATED MYOCARDIAL INFARCTION) (HCC): Primary | ICD-10-CM

## 2020-07-28 PROCEDURE — 1111F DSCHRG MED/CURRENT MED MERGE: CPT | Performed by: FAMILY MEDICINE

## 2020-07-28 PROCEDURE — 99214 OFFICE O/P EST MOD 30 MIN: CPT | Performed by: FAMILY MEDICINE

## 2020-07-28 RX ORDER — LISINOPRIL 10 MG/1
TABLET ORAL
Qty: 30 TABLET | Refills: 0 | OUTPATIENT
Start: 2020-07-28

## 2020-07-28 RX ORDER — FLASH GLUCOSE SENSOR
KIT MISCELLANEOUS
Qty: 4 EACH | Refills: 3 | Status: SHIPPED | OUTPATIENT
Start: 2020-07-28 | End: 2020-07-29

## 2020-07-28 RX ORDER — ROSUVASTATIN CALCIUM 10 MG/1
TABLET, COATED ORAL
Qty: 30 TABLET | Refills: 0 | OUTPATIENT
Start: 2020-07-28

## 2020-07-28 NOTE — PATIENT INSTRUCTIONS
Thank you for choosing Deepti Holliday MD at [place] [address]  To Do: Jb Ruggiero E Jair  1. Please take meds as directed. [facility] is located in [address]. Monday, Tuesday & Friday – 8 a.m. to 4 p.m. Wednesday, Thursday – 7 a.m. to 3 p.m. those potential risks and we strive to make you healthier and to improve your quality of life.     Referrals, and Radiology Information:    If your insurance requires a referral to a specialist, please allow 5 business days to process your referral request.

## 2020-07-28 NOTE — PROGRESS NOTES
HPI:    Patient ID: Ashli Ro is a 76year old male. HPI  Mr. Maye Vo is a pleasant 80-year-old gentleman who was recently hospitalized at [facility] on  7/8/2020 after he had fallen.   He was sent to the emergency room which showed elevation in for signs/symptoms of dizziness, lightheadedness 1 Device 0   • lisinopril 10 MG Oral Tab Take 0.5 tablets (5 mg total) by mouth daily. 0   • Rosuvastatin Calcium 20 MG Oral Tab Take 1 tablet (20 mg total) by mouth nightly.  30 tablet 3   • aspirin 81 MG O monitoring home glucose levels; will refer to diabetes educator for further management    Follow-up as scheduled or as needed or call if with further concerns. No orders of the defined types were placed in this encounter.       Meds This Visit:  Requested

## 2020-07-28 NOTE — TELEPHONE ENCOUNTER
Calling to let [facility] know that they saw patient yesterday, and will be following up with PT, OT, and nursing,

## 2020-07-29 ENCOUNTER — TELEPHONE (OUTPATIENT)
Dept: FAMILY MEDICINE CLINIC | Facility: CLINIC | Age: 75
End: 2020-07-29

## 2020-07-29 RX ORDER — FLASH GLUCOSE SENSOR
KIT MISCELLANEOUS
Qty: 14 EACH | Refills: 3 | Status: SHIPPED | OUTPATIENT
Start: 2020-07-29 | End: 2021-05-24

## 2020-07-31 ENCOUNTER — NURSE ONLY (OUTPATIENT)
Dept: ENDOCRINOLOGY CLINIC | Facility: CLINIC | Age: 75
End: 2020-07-31
Payer: MEDICARE

## 2020-07-31 DIAGNOSIS — I50.20 HEART FAILURE WITH REDUCED EJECTION FRACTION (HCC): ICD-10-CM

## 2020-07-31 DIAGNOSIS — E11.9 DIABETES MELLITUS TYPE 2 IN NONOBESE (HCC): ICD-10-CM

## 2020-07-31 DIAGNOSIS — I21.4 NSTEMI (NON-ST ELEVATED MYOCARDIAL INFARCTION) (HCC): ICD-10-CM

## 2020-07-31 DIAGNOSIS — R73.9 HYPERGLYCEMIA: ICD-10-CM

## 2020-07-31 PROCEDURE — 95249 CONT GLUC MNTR PT PROV EQP: CPT | Performed by: DIETITIAN, REGISTERED

## 2020-08-03 VITALS — SYSTOLIC BLOOD PRESSURE: 144 MMHG | DIASTOLIC BLOOD PRESSURE: 70 MMHG | HEART RATE: 53 BPM

## 2020-08-03 NOTE — PROGRESS NOTES
Geoffrey Chanlitgabby  [id number] was seen for Continuous Glucose Sensor Follow-up Visit:    Date: 7/31/2020  Referring Provider:Dr. Bennett Pantoja.  Edilberto  Start time:1pm End time: 2pm    Sensor Type: Alanna personal      Comments: Pt. had to pay cash for [employer] personal s

## 2020-08-05 ENCOUNTER — OFFICE VISIT (OUTPATIENT)
Dept: CARDIOLOGY | Age: 75
End: 2020-08-05

## 2020-08-05 VITALS — SYSTOLIC BLOOD PRESSURE: 150 MMHG | DIASTOLIC BLOOD PRESSURE: 68 MMHG | HEART RATE: 64 BPM

## 2020-08-05 DIAGNOSIS — I51.81 TAKOTSUBO CARDIOMYOPATHY: Primary | ICD-10-CM

## 2020-08-05 DIAGNOSIS — I25.10 ATHEROSCLEROSIS OF CORONARY ARTERY OF NATIVE HEART WITHOUT ANGINA PECTORIS, UNSPECIFIED VESSEL OR LESION TYPE: ICD-10-CM

## 2020-08-05 PROCEDURE — 99204 OFFICE O/P NEW MOD 45 MIN: CPT | Performed by: NURSE PRACTITIONER

## 2020-08-05 RX ORDER — PRASUGREL 10 MG/1
10 TABLET, FILM COATED ORAL
COMMUNITY
Start: 2020-07-10 | End: 2020-10-06 | Stop reason: SDUPTHER

## 2020-08-05 RX ORDER — FLASH GLUCOSE SENSOR
KIT MISCELLANEOUS
COMMUNITY
Start: 2020-07-10

## 2020-08-05 RX ORDER — ALLOPURINOL 100 MG/1
100 TABLET ORAL
COMMUNITY
Start: 2020-06-10

## 2020-08-05 RX ORDER — MELOXICAM 15 MG/1
TABLET ORAL
COMMUNITY
Start: 2020-08-02 | End: 2020-10-05

## 2020-08-05 RX ORDER — LANCETS 30 GAUGE
EACH MISCELLANEOUS
COMMUNITY
Start: 2020-07-10 | End: 2020-09-28 | Stop reason: SDUPTHER

## 2020-08-05 RX ORDER — MULTIVITAMIN
1 TABLET ORAL
COMMUNITY

## 2020-08-05 RX ORDER — ROSUVASTATIN CALCIUM 20 MG/1
20 TABLET, COATED ORAL NIGHTLY
COMMUNITY
Start: 2020-07-25

## 2020-08-05 RX ORDER — ROSUVASTATIN CALCIUM 10 MG/1
10 TABLET, COATED ORAL DAILY
COMMUNITY
Start: 2020-07-02 | End: 2020-08-05 | Stop reason: DRUGHIGH

## 2020-08-05 RX ORDER — SPIRONOLACTONE 25 MG/1
12.5 TABLET ORAL DAILY
COMMUNITY
Start: 2020-07-25 | End: 2020-10-06 | Stop reason: SDUPTHER

## 2020-08-05 RX ORDER — LISINOPRIL 5 MG/1
5 TABLET ORAL DAILY
COMMUNITY
Start: 2020-07-25 | End: 2020-08-05 | Stop reason: DRUGHIGH

## 2020-08-05 RX ORDER — LISINOPRIL 10 MG/1
5 TABLET ORAL
COMMUNITY
Start: 2020-07-10 | End: 2020-09-28 | Stop reason: SDUPTHER

## 2020-08-05 ASSESSMENT — ENCOUNTER SYMPTOMS
COUGH: 0
SHORTNESS OF BREATH: 0
BRUISES/BLEEDS EASILY: 0
ALLERGIC/IMMUNOLOGIC COMMENTS: NO NEW FOOD ALLERGIES
HEMOPTYSIS: 0
SUSPICIOUS LESIONS: 0
CHILLS: 0
WEIGHT GAIN: 0
WEIGHT LOSS: 0
FEVER: 0
HEMATOCHEZIA: 0

## 2020-08-07 ENCOUNTER — TELEPHONE (OUTPATIENT)
Dept: CARDIOLOGY | Age: 75
End: 2020-08-07

## 2020-08-14 ENCOUNTER — NURSE ONLY (OUTPATIENT)
Dept: ENDOCRINOLOGY CLINIC | Facility: CLINIC | Age: 75
End: 2020-08-14
Payer: MEDICARE

## 2020-08-14 ENCOUNTER — TELEPHONE (OUTPATIENT)
Dept: ENDOCRINOLOGY CLINIC | Facility: CLINIC | Age: 75
End: 2020-08-14

## 2020-08-14 PROCEDURE — G0108 DIAB MANAGE TRN  PER INDIV: HCPCS | Performed by: DIETITIAN, REGISTERED

## 2020-08-19 ENCOUNTER — OFFICE VISIT (OUTPATIENT)
Dept: PHYSICAL THERAPY | Age: 75
End: 2020-08-19
Attending: FAMILY MEDICINE
Payer: MEDICARE

## 2020-08-19 VITALS — TEMPERATURE: 97 F | SYSTOLIC BLOOD PRESSURE: 164 MMHG | DIASTOLIC BLOOD PRESSURE: 82 MMHG | HEART RATE: 58 BPM

## 2020-08-19 DIAGNOSIS — G89.29 CHRONIC RIGHT SHOULDER PAIN: ICD-10-CM

## 2020-08-19 DIAGNOSIS — M25.511 CHRONIC RIGHT SHOULDER PAIN: ICD-10-CM

## 2020-08-19 PROCEDURE — 97110 THERAPEUTIC EXERCISES: CPT

## 2020-08-19 PROCEDURE — 97161 PT EVAL LOW COMPLEX 20 MIN: CPT

## 2020-08-19 PROCEDURE — 97140 MANUAL THERAPY 1/> REGIONS: CPT

## 2020-08-19 NOTE — PROGRESS NOTES
"Dee Ledezma is a  61year old female here today for an illness. Symptoms started 2 days ago. Symptoms include fever up to 102 degrees, achy, headache, chills, dry cough which keeps her up at night, slight congestion. Patient has tried tylenol. Her grandkids have been sick with the stomach flu. Her  had a dry cough. She did have a flu shot this year. Review of Systems   Constitutional: Positive for fever. HENT: Positive for congestion. Negative for ear pain and sore throat. Respiratory: Positive for cough. Visit Vitals  /74 (BP Location: [facility], Patient Position: Sitting)   Pulse 99   Temp 100.7 Â°F (38.2 Â°C) (Tympanic)   Resp 14   Ht 5' 1.5"" (1.562 m)   Wt 56.2 kg (124 lb)   SpO2 99%   BMI 23.05 kg/mÂ²     Physical Exam   Constitutional: She appears well-developed and well-nourished. HENT:   Right Ear: Tympanic membrane and ear canal normal.   Left Ear: Tympanic membrane and ear canal normal.   Mouth/Throat: Uvula is midline, oropharynx is clear and moist and mucous membranes are normal.   Eyes: Conjunctivae are normal.   Neck: Neck supple. Cardiovascular: Normal rate, regular rhythm and normal heart sounds. No murmur heard. Pulmonary/Chest: Effort normal and breath sounds normal.   Lymphadenopathy:     She has no cervical adenopathy. Psychiatric: She has a normal mood and affect. Assessment and Plan    Influenza  You are contagious until you are fever free for 24 hours. Continue with symptomatic treatment. You can develop secondary infections from the flu so if your symptoms last longer than 1 week or worsen, please follow up.     Faiza Nicholas PA-C  " SHOULDER EVALUATION:   Referring Physician: Dr. Ana Sanchez  Diagnosis: ALPA shoulder adhesive capsulitis     Date of Service: 8/19/2020     PATIENT SUMMARY   Shantanu Mccarthy is a 76year old male who presents to therapy today with complaints of R shoulder pain Osteoarthritis of left knee (2012), and Other and unspecified hyperlipidemia. ASSESSMENT  Hyacinth Jewell presents to physical therapy evaluation with primary c/o R shoulder pain and stiffness.  The results of the objective tests and measures show significant restri for possible soreness after evaluation and modalities as needed [ice/heat].   Patient was instructed in and issued a HEP for: PROM seated table slides flexion & ABD x10 ea, wand ER x10 ea seated  Manual Therapy GH jt mobs posterior and inferior glides Gr II contact me at Dept: [phone]    Sincerely,  Electronically signed by therapist: Swathi Mirza, PT  [de-identified] certification required: Yes  I certify the need for these services furnished under this plan of treatment and while under my care.     X__

## 2020-08-20 ENCOUNTER — OFFICE VISIT (OUTPATIENT)
Dept: PHYSICAL THERAPY | Age: 75
End: 2020-08-20
Attending: FAMILY MEDICINE
Payer: MEDICARE

## 2020-08-20 PROCEDURE — 97110 THERAPEUTIC EXERCISES: CPT

## 2020-08-20 PROCEDURE — 97140 MANUAL THERAPY 1/> REGIONS: CPT

## 2020-08-20 NOTE — PROGRESS NOTES
Paola Moreau Jair  [id number] was seen for Continuous Glucose Sensor Follow-up Visit:    Date: 8/14/2020  Referring Provider: Dr. Lopez Velasco.  Edilberto  Start time: 12:30pm End time: 1:00pm    Sensor Type: Alanna Personal    Site Assessment: No redness;sensor intact

## 2020-08-20 NOTE — TELEPHONE ENCOUNTER
allopurinol 100 MG Oral Tab  lisinopril 5 MG Oral Tab    [id number] [place] [id number] [facility], [phone]

## 2020-08-20 NOTE — PROGRESS NOTES
Dx: ALPA shld adhesive capsulitis         Insurance (Authorized # of Visits):  Medicare           Authorizing Physician: Dr. Adelfo Naranjo  Next MD visit: none scheduled  Fall Risk: standard         Precautions: n/a             Subjective: Shoulder felt good aft min PROM shoulder all planes x5 min R, 15 min L  STM R GH capsule x6 min       Therex  Supine wand AAROM flexion x6  Seated wand AAROM scaption x6 R  Seated wand ER AAROM x10 R       Modalities  MHP R shoulder x10 min       HEP: PROM seated table slides fl

## 2020-08-21 RX ORDER — ALLOPURINOL 100 MG/1
100 TABLET ORAL 2 TIMES DAILY
Qty: 180 TABLET | Refills: 1 | Status: SHIPPED | OUTPATIENT
Start: 2020-08-21

## 2020-08-21 RX ORDER — LISINOPRIL 10 MG/1
5 TABLET ORAL DAILY
Qty: 45 TABLET | Refills: 1 | Status: SHIPPED | OUTPATIENT
Start: 2020-08-21 | End: 2020-09-08

## 2020-08-21 NOTE — TELEPHONE ENCOUNTER
lisinopril 10 MG Oral Tab               Sig: Take 0.5 tablets (5 mg total) by mouth daily.     Disp:  90 tablet    Refills:  1    Start: 8/20/2020    Class: Normal    Last ordered: 1 month ago by Kiki Onofre MD     Hypertension Medications Protocol P

## 2020-08-25 ENCOUNTER — OFFICE VISIT (OUTPATIENT)
Dept: PHYSICAL THERAPY | Age: 75
End: 2020-08-25
Attending: FAMILY MEDICINE
Payer: MEDICARE

## 2020-08-25 PROCEDURE — 97140 MANUAL THERAPY 1/> REGIONS: CPT

## 2020-08-25 PROCEDURE — 97110 THERAPEUTIC EXERCISES: CPT

## 2020-08-25 NOTE — PROGRESS NOTES
Dx: ALPA shld adhesive capsulitis         Insurance (Authorized # of Visits):  Medicare           Authorizing Physician: Dr. Giovanni Marcial  Next MD visit: none scheduled  Fall Risk: standard         Precautions: n/a             Subjective: Shoulder felt looser a 8/20/2020  TX#: 2/16 Date: 8/25/2020             TX#: 3/16 Date:                 TX#: 4/ Date:                 TX#: 5/ Date:    Tx#: 6/   Manual Therapy   jt mobs posterior and inferior glides Gr II-III x3 min R; x10 min PROM shoulder all planes x5 min R,

## 2020-08-26 ENCOUNTER — OFFICE VISIT (OUTPATIENT)
Dept: PHYSICAL THERAPY | Age: 75
End: 2020-08-26
Attending: FAMILY MEDICINE
Payer: MEDICARE

## 2020-08-26 PROCEDURE — 97110 THERAPEUTIC EXERCISES: CPT

## 2020-08-26 PROCEDURE — 97140 MANUAL THERAPY 1/> REGIONS: CPT

## 2020-08-26 NOTE — PROGRESS NOTES
Dx: ALPA shld adhesive capsulitis         Insurance (Authorized # of Visits):  Medicare           Authorizing Physician: Dr. Arin Simpson MD visit: none scheduled  Fall Risk: standard         Precautions: n/a             Subjective: Shoulder continues to inferior glides Gr II-III x3 min R; x10 min PROM shoulder all planes x5 min R, 15 min L  STM R GH capsule x6 min Manual Therapy  GH jt mobs posterior and inferior glides Gr II-III R x10 min    PROM shoulder all planes R 15 min L  STM R GH capsule x6 min  M

## 2020-08-27 ENCOUNTER — OFFICE VISIT (OUTPATIENT)
Dept: PHYSICAL THERAPY | Age: 75
End: 2020-08-27
Attending: FAMILY MEDICINE
Payer: MEDICARE

## 2020-08-27 PROCEDURE — 97140 MANUAL THERAPY 1/> REGIONS: CPT

## 2020-08-27 PROCEDURE — 97110 THERAPEUTIC EXERCISES: CPT

## 2020-08-27 NOTE — PROGRESS NOTES
Dx: ALPA shld adhesive capsulitis         Insurance (Authorized # of Visits):  Medicare           Authorizing Physician: Dr. Maximino Helms  Next MD visit: none scheduled  Fall Risk: standard         Precautions: n/a             Subjective: Pt reports his shoulde comprehensive HEP to maintain progress achieved in PT -progress    Plan:progress AA/AROM as able  Date: 8/20/2020  TX#: 2/16 Date: 8/25/2020             TX#: 3/16 Date: 8/26/2020           TX#: 4/16 Date: 8/27/2020             TX#: 5/16 Date:    Tx#: 6/   M

## 2020-09-01 ENCOUNTER — OFFICE VISIT (OUTPATIENT)
Dept: PHYSICAL THERAPY | Age: 75
End: 2020-09-01
Attending: FAMILY MEDICINE
Payer: MEDICARE

## 2020-09-01 PROCEDURE — 97140 MANUAL THERAPY 1/> REGIONS: CPT

## 2020-09-01 PROCEDURE — 97110 THERAPEUTIC EXERCISES: CPT

## 2020-09-01 NOTE — PROGRESS NOTES
Dx: ALPA shld adhesive capsulitis         Insurance (Authorized # of Visits):  Medicare           Authorizing Physician: Dr. [place] ref.  provider found  Next MD visit: none scheduled  Fall Risk: standard         Precautions: n/a             Subjective: SHoulder posterior and inferior glides Gr II-III x3 min R; x10 min PROM shoulder all planes x5 min R, 15 min L  STM R GH capsule x6 min Manual Therapy  GH jt mobs posterior and inferior glides Gr II-III R x10 min    PROM shoulder all planes R 15 min L  STM R GH cap

## 2020-09-03 ENCOUNTER — OFFICE VISIT (OUTPATIENT)
Dept: PHYSICAL THERAPY | Age: 75
End: 2020-09-03
Attending: FAMILY MEDICINE
Payer: MEDICARE

## 2020-09-03 PROCEDURE — 97110 THERAPEUTIC EXERCISES: CPT

## 2020-09-03 PROCEDURE — 97140 MANUAL THERAPY 1/> REGIONS: CPT

## 2020-09-03 NOTE — PROGRESS NOTES
Dx: ALPA shld adhesive capsulitis         Insurance (Authorized # of Visits):  Medicare           Authorizing Physician: Dr. Adelfo Naranjo Next MD visit: none scheduled  Fall Risk: standard         Precautions: n/a             Subjective: Shoulder pain was bad o 6/16 Date: 9/3/2020  Tx#: 7/16   Manual Therapy  GH jt mobs posterior and inferior glides Gr II-III x3 min R; x10 min PROM shoulder all planes x5 min R, 15 min L  STM R GH capsule x6 min Manual Therapy  GH jt mobs posterior and inferior glides Gr II-III R x10 min Modalities  MHP R shoulder x10 min Modalities  MHP R shoulder x5 min Modalities  MHP R shoulder x10 min -at home Modalities  MHP R shoulder x10 min   HEP: PROM seated table slides flexion & ABD x10 ea, wand ER x10 ea seated    Charges:  Man x2, ther

## 2020-09-08 ENCOUNTER — OFFICE VISIT (OUTPATIENT)
Dept: PHYSICAL THERAPY | Age: 75
End: 2020-09-08
Attending: FAMILY MEDICINE
Payer: MEDICARE

## 2020-09-08 PROCEDURE — 97140 MANUAL THERAPY 1/> REGIONS: CPT

## 2020-09-08 PROCEDURE — 97110 THERAPEUTIC EXERCISES: CPT

## 2020-09-08 RX ORDER — LISINOPRIL 10 MG/1
TABLET ORAL
Qty: 90 TABLET | Refills: 1 | Status: SHIPPED | OUTPATIENT
Start: 2020-09-08 | End: 2020-09-28 | Stop reason: DRUGHIGH

## 2020-09-08 RX ORDER — ROSUVASTATIN CALCIUM 10 MG/1
TABLET, COATED ORAL
Qty: 30 TABLET | Refills: 0 | OUTPATIENT
Start: 2020-09-08

## 2020-09-08 NOTE — PROGRESS NOTES
Dx: ALPA shld adhesive capsulitis         Insurance (Authorized # of Visits):  Medicare           Authorizing Physician: Dr. Stephen Herrera Next MD visit: none scheduled  Fall Risk: standard         Precautions: n/a             Subjective: Shoulder pain was bad l independent and compliant with comprehensive HEP to maintain progress achieved in PT -progress    Plan: reassess AROM and continue functional reaching as able   Date: 8/25/2020             TX#: 3/16 Date: 8/26/2020           TX#: 4/16 Date: 8/27/2020 down scaption (MIN A) x8  Isometric RTC holds 5s (manual resist)  -flex x8  -ABD x8  -IR x8  -ER x8 Therex  Sidelying AAROM Flex, ABD, ER 2x8 ea R  seated RTB pull down scaption (MIN A) x8  Isometric RTC holds 5s (manual resist)  -flex x8  -ABD x8  -IR x8

## 2020-09-08 NOTE — TELEPHONE ENCOUNTER
Hypertension Medications Protocol Passed9/7 10:15 AM   CMP or BMP in past 12 months    Last serum creatinine< 2.0    Appointment in past 6 or next 3 months     Refill protocol passed because the patient met the following protocol for    Requested Prescript

## 2020-09-10 ENCOUNTER — TELEPHONE (OUTPATIENT)
Dept: PHYSICAL THERAPY | Age: 75
End: 2020-09-10

## 2020-09-10 ENCOUNTER — APPOINTMENT (OUTPATIENT)
Dept: PHYSICAL THERAPY | Age: 75
End: 2020-09-10
Attending: FAMILY MEDICINE
Payer: MEDICARE

## 2020-09-15 ENCOUNTER — OFFICE VISIT (OUTPATIENT)
Dept: PHYSICAL THERAPY | Age: 75
End: 2020-09-15
Attending: FAMILY MEDICINE
Payer: MEDICARE

## 2020-09-15 PROCEDURE — 97110 THERAPEUTIC EXERCISES: CPT

## 2020-09-15 PROCEDURE — 97140 MANUAL THERAPY 1/> REGIONS: CPT

## 2020-09-15 NOTE — PROGRESS NOTES
Dx: ALPA shld adhesive capsulitis         Insurance (Authorized # of Visits):  Medicare           Authorizing Physician: Dr. Ana Sanchez Next MD visit: none scheduled  Fall Risk: standard         Precautions: n/a             Subjective: Pt reports that some da improve function with light housework and holding pans in kitchen - mild progress  · Pt will be independent and compliant with comprehensive HEP to maintain progress achieved in PT -progress    Plan: resume gentle isometric strengthening and/or sidelying A AROM cone reach for   -flexion 2x5 R  -ER 2x5 R  -ABD x5  Seated t-band IR 2x10 ea  -ER 2x10 ea Therex  Seated tband pull down scaption (MIN A) x8  Isometric RTC holds 5s (manual resist)  -flex x8  -ABD x8  -IR x8  -ER x8 Therex  Sidelying AAROM Flex, ABD,

## 2020-09-17 ENCOUNTER — OFFICE VISIT (OUTPATIENT)
Dept: PHYSICAL THERAPY | Age: 75
End: 2020-09-17
Attending: FAMILY MEDICINE
Payer: MEDICARE

## 2020-09-17 PROCEDURE — 97110 THERAPEUTIC EXERCISES: CPT

## 2020-09-17 PROCEDURE — 97140 MANUAL THERAPY 1/> REGIONS: CPT

## 2020-09-17 RX ORDER — ROSUVASTATIN CALCIUM 20 MG/1
20 TABLET, COATED ORAL NIGHTLY
Qty: 90 TABLET | Refills: 1 | Status: SHIPPED | OUTPATIENT
Start: 2020-09-17 | End: 2021-04-19

## 2020-09-17 NOTE — PROGRESS NOTES
ProgressSummary  Pt has attended 10 visits in Physical Therapy.    Dx: ALPA shld adhesive capsulitis         Insurance (Authorized # of Visits):  Medicare           Authorizing Physician: Dr. Claudette Berumen Next MD visit: 10/19 with PCP  Fall Risk: standard 80*/138 (20/95 at IE); L 120/130  Abduction: R 75*/145 (50/90 at IE); L 55/130  ER: R 60*/70 (42/45 at IE); L 48  IR: R 71 (44/50 at IE); L 75     Palpation: bony ALPA shoulders with decreased mm tone ALPA throughout RTC and pecs, tenderness and increased ed [phone]. Sincerely,  Electronically signed by therapist: Sandra Palma PT     [de-identified] certification required:  Yes  Please co-sign or sign and return this letter via fax as soon as possible to [phone].    I certify the need for these s Therapy  GH jt mobs posterior inferior glides, anterior, lateral distraction Gr II-III R x6 min    PROM shoulder all planes R x20 min   STM R GH capsule x2 min   -Sidelying STM and MFR R RTC teres minor, infraspinatus, supraspinatus x4 min   Therex  Supine

## 2020-09-17 NOTE — TELEPHONE ENCOUNTER
Cholesterol Medication Protocol Passed9/17 12:08 PM   ALT < 80    ALT resulted within past year    Lipid panel within past 12 months    Appointment within past 12 or next 3 months     Refill protocol passed because the patient met the following protocol fo

## 2020-09-22 ENCOUNTER — OFFICE VISIT (OUTPATIENT)
Dept: PHYSICAL THERAPY | Age: 75
End: 2020-09-22
Attending: FAMILY MEDICINE
Payer: MEDICARE

## 2020-09-22 PROCEDURE — 97140 MANUAL THERAPY 1/> REGIONS: CPT

## 2020-09-22 PROCEDURE — 97110 THERAPEUTIC EXERCISES: CPT

## 2020-09-22 NOTE — PROGRESS NOTES
Dx: ALPA shld adhesive capsulitis         Insurance (Authorized # of Visits):  Medicare           Authorizing Physician: Dr. Tino Penn Next MD visit: 10/19 with PCP  Fall Risk: standard         Precautions: n/a             Subjective Pt reports he woke yeste -good recent progress  · Pt will improve shoulder flexion AROM to >120 degrees to be able to reach into kitchen cabinets without pain or restriction -progress in supine  · Pt will improve shoulder abduction AROM to >100 degrees to improve ability to don/do shoulder all planes R x20 min   STM R GH capsule x2 min   -Sidelying STM and MFR R RTC teres minor, infraspinatus, supraspinatus x4 min Manual Therapy  GH jt mobs posterior inferior glides, anterior, lateral distraction Gr II-III R x5 min    PROM shoulder

## 2020-09-24 ENCOUNTER — OFFICE VISIT (OUTPATIENT)
Dept: PHYSICAL THERAPY | Age: 75
End: 2020-09-24
Attending: FAMILY MEDICINE
Payer: MEDICARE

## 2020-09-24 PROCEDURE — 97140 MANUAL THERAPY 1/> REGIONS: CPT

## 2020-09-24 PROCEDURE — 97110 THERAPEUTIC EXERCISES: CPT

## 2020-09-24 NOTE — PROGRESS NOTES
Dx: ALPA shld adhesive capsulitis         Insurance (Authorized # of Visits):  Medicare           Authorizing Physician: Dr. Zachariah Russell Next MD visit: 10/19 with PCP  Fall Risk: standard         Precautions: n/a             Subjective Pt reports that his shou pain or restriction -progress in supine  · Pt will improve shoulder abduction AROM to >100 degrees to improve ability to don/doff shirts and coats -progress in supine  · Pt will increase shoulder AROM ER to 50 deg to be able to reach and fasten seatbelt -M STM R GH capsule x3 min   Sidelying STM and MFR R RTC teres minor, infraspinatus, supraspinatus x4 min   Therex  Sidelying AAROM Flex, ABD, ER 2x8 ea R  seated RTB pull down scaption (MIN A) x8  Isometric RTC holds 5s (manual resist)  -flex x8  -ABD x8

## 2020-09-28 ENCOUNTER — TELEPHONE (OUTPATIENT)
Dept: FAMILY MEDICINE CLINIC | Facility: CLINIC | Age: 75
End: 2020-09-28

## 2020-09-28 RX ORDER — LISINOPRIL 5 MG/1
5 TABLET ORAL DAILY
Qty: 90 TABLET | Refills: 1 | Status: SHIPPED | OUTPATIENT
Start: 2020-09-28 | End: 2021-05-24 | Stop reason: DRUGHIGH

## 2020-09-28 NOTE — TELEPHONE ENCOUNTER
Pt states he has been getting Lisinopril 10mg tablets and cutting them in half and just taking 5mg daily. Pt states the pharmacy won't cut them in half unless they hear from our office. Please clarify dosing.  Rx sent on 8/21/2020 states \"take 0.5 table

## 2020-09-28 NOTE — TELEPHONE ENCOUNTER
Rx for Lisinopril 5mg sent to pharmacy. Spoke to pt and informed. Pt verbalized understanding and agreement. All questions answered.

## 2020-09-30 ENCOUNTER — OFFICE VISIT (OUTPATIENT)
Dept: PHYSICAL THERAPY | Age: 75
End: 2020-09-30
Attending: FAMILY MEDICINE
Payer: MEDICARE

## 2020-09-30 PROCEDURE — 97140 MANUAL THERAPY 1/> REGIONS: CPT

## 2020-09-30 PROCEDURE — 97110 THERAPEUTIC EXERCISES: CPT

## 2020-09-30 NOTE — PROGRESS NOTES
Adrina  Pt has attended 13 visits in Physical Therapy.    Dx: ALPA shld adhesive capsulitis         Insurance (Authorized # of Visits):  Medicare           Authorizing Physician: Dr. Andrey Olivia Next MD visit: 10/19 with PCP         Subjective/Asses improve shoulder strength throughout to 4/5 to improve function with light housework and holding pans in kitchen - mild progress  · Pt will be independent and compliant with comprehensive HEP to maintain progress achieved in PT -MET    Plan: D/C with sesar STM R GH capsule x3 min   Sidelying STM and MFR R RTC teres minor, infraspinatus, supraspinatus x4 min Manual Therapy  GH jt mobs posterior inferior glides, anterior, lateral distraction Gr II-III R x5 min    PROM shoulder all planes R x15 min   STM R GH

## 2020-10-01 RX ORDER — LISINOPRIL 5 MG/1
5 TABLET ORAL
COMMUNITY
Start: 2020-09-28 | End: 2020-10-06 | Stop reason: SDUPTHER

## 2020-10-05 ENCOUNTER — TELEPHONE (OUTPATIENT)
Dept: CARDIOLOGY | Age: 75
End: 2020-10-05

## 2020-10-05 PROBLEM — G62.9 PERIPHERAL NEUROPATHY: Status: ACTIVE | Noted: 2020-10-05

## 2020-10-05 PROBLEM — I10 HYPERTENSION: Status: ACTIVE | Noted: 2020-10-05

## 2020-10-05 PROBLEM — E78.5 DYSLIPIDEMIA: Status: ACTIVE | Noted: 2020-10-05

## 2020-10-05 PROBLEM — E11.9 CONTROLLED TYPE 2 DIABETES MELLITUS, WITHOUT LONG-TERM CURRENT USE OF INSULIN (CMD): Status: ACTIVE | Noted: 2020-10-05

## 2020-10-05 PROBLEM — I21.09: Status: ACTIVE | Noted: 2020-10-05

## 2020-10-05 PROBLEM — R27.0 ATAXIA: Status: ACTIVE | Noted: 2020-10-05

## 2020-10-05 PROBLEM — Z95.5 PRESENCE OF DRUG COATED STENT IN LAD CORONARY ARTERY: Status: ACTIVE | Noted: 2020-10-05

## 2020-10-06 ENCOUNTER — LAB ENCOUNTER (OUTPATIENT)
Dept: LAB | Facility: HOSPITAL | Age: 75
End: 2020-10-06
Attending: INTERNAL MEDICINE
Payer: MEDICARE

## 2020-10-06 ENCOUNTER — HOSPITAL ENCOUNTER (OUTPATIENT)
Dept: CV DIAGNOSTICS | Facility: HOSPITAL | Age: 75
Discharge: HOME | End: 2020-10-06
Attending: INTERNAL MEDICINE
Payer: MEDICARE

## 2020-10-06 ENCOUNTER — OFFICE VISIT (OUTPATIENT)
Dept: CARDIOLOGY | Age: 75
End: 2020-10-06

## 2020-10-06 VITALS — WEIGHT: 148 LBS | HEART RATE: 58 BPM | SYSTOLIC BLOOD PRESSURE: 136 MMHG | DIASTOLIC BLOOD PRESSURE: 70 MMHG

## 2020-10-06 DIAGNOSIS — Z12.5 SCREENING FOR MALIGNANT NEOPLASM OF PROSTATE: ICD-10-CM

## 2020-10-06 DIAGNOSIS — I25.10 ATHEROSCLEROSIS OF CORONARY ARTERY OF NATIVE HEART WITHOUT ANGINA PECTORIS, UNSPECIFIED VESSEL OR LESION TYPE: ICD-10-CM

## 2020-10-06 DIAGNOSIS — I51.81 TAKOTSUBO CARDIOMYOPATHY: ICD-10-CM

## 2020-10-06 DIAGNOSIS — I10 ESSENTIAL HYPERTENSION: ICD-10-CM

## 2020-10-06 DIAGNOSIS — E78.00 PURE HYPERCHOLESTEROLEMIA: ICD-10-CM

## 2020-10-06 DIAGNOSIS — E78.5 DYSLIPIDEMIA: ICD-10-CM

## 2020-10-06 DIAGNOSIS — E29.1 HYPOGONADISM IN MALE: ICD-10-CM

## 2020-10-06 DIAGNOSIS — I21.09: ICD-10-CM

## 2020-10-06 DIAGNOSIS — I25.10 ATHEROSCLEROSIS OF NATIVE CORONARY ARTERY OF NATIVE HEART WITHOUT ANGINA PECTORIS: Primary | ICD-10-CM

## 2020-10-06 PROCEDURE — 80053 COMPREHEN METABOLIC PANEL: CPT

## 2020-10-06 PROCEDURE — 85025 COMPLETE CBC W/AUTO DIFF WBC: CPT

## 2020-10-06 PROCEDURE — 93306 TTE W/DOPPLER COMPLETE: CPT | Performed by: INTERNAL MEDICINE

## 2020-10-06 PROCEDURE — 84443 ASSAY THYROID STIM HORMONE: CPT

## 2020-10-06 PROCEDURE — 84402 ASSAY OF FREE TESTOSTERONE: CPT

## 2020-10-06 PROCEDURE — 84403 ASSAY OF TOTAL TESTOSTERONE: CPT

## 2020-10-06 PROCEDURE — 36415 COLL VENOUS BLD VENIPUNCTURE: CPT

## 2020-10-06 PROCEDURE — 80061 LIPID PANEL: CPT

## 2020-10-06 PROCEDURE — 99215 OFFICE O/P EST HI 40 MIN: CPT | Performed by: INTERNAL MEDICINE

## 2020-10-06 RX ORDER — PRASUGREL 10 MG/1
10 TABLET, FILM COATED ORAL DAILY
Qty: 90 TABLET | Refills: 3 | Status: SHIPPED | OUTPATIENT
Start: 2020-10-06

## 2020-10-06 RX ORDER — LISINOPRIL 5 MG/1
5 TABLET ORAL DAILY
Qty: 90 TABLET | Refills: 3 | Status: SHIPPED | OUTPATIENT
Start: 2020-10-06 | End: 2021-03-18 | Stop reason: DRUGHIGH

## 2020-10-06 RX ORDER — SPIRONOLACTONE 25 MG/1
12.5 TABLET ORAL DAILY
Qty: 45 TABLET | Refills: 3 | Status: SHIPPED | OUTPATIENT
Start: 2020-10-06

## 2020-10-06 ASSESSMENT — PATIENT HEALTH QUESTIONNAIRE - PHQ9
SUM OF ALL RESPONSES TO PHQ9 QUESTIONS 1 AND 2: 0
SUM OF ALL RESPONSES TO PHQ9 QUESTIONS 1 AND 2: 0
CLINICAL INTERPRETATION OF PHQ9 SCORE: NO FURTHER SCREENING NEEDED
2. FEELING DOWN, DEPRESSED OR HOPELESS: NOT AT ALL
1. LITTLE INTEREST OR PLEASURE IN DOING THINGS: NOT AT ALL
CLINICAL INTERPRETATION OF PHQ2 SCORE: NO FURTHER SCREENING NEEDED

## 2020-10-09 ENCOUNTER — TELEPHONE (OUTPATIENT)
Dept: CARDIOLOGY | Age: 75
End: 2020-10-09

## 2020-10-19 ENCOUNTER — OFFICE VISIT (OUTPATIENT)
Dept: FAMILY MEDICINE CLINIC | Facility: CLINIC | Age: 75
End: 2020-10-19
Payer: MEDICARE

## 2020-10-19 VITALS
SYSTOLIC BLOOD PRESSURE: 122 MMHG | OXYGEN SATURATION: 96 % | DIASTOLIC BLOOD PRESSURE: 60 MMHG | HEART RATE: 60 BPM | TEMPERATURE: 97 F | RESPIRATION RATE: 16 BRPM

## 2020-10-19 DIAGNOSIS — Z00.00 ENCOUNTER FOR ANNUAL HEALTH EXAMINATION: ICD-10-CM

## 2020-10-19 DIAGNOSIS — Z28.21 INFLUENZA VACCINATION DECLINED BY PATIENT: ICD-10-CM

## 2020-10-19 DIAGNOSIS — E11.9 DIABETES MELLITUS TYPE 2 IN NONOBESE (HCC): ICD-10-CM

## 2020-10-19 DIAGNOSIS — Z00.00 ENCOUNTER FOR ANNUAL WELLNESS EXAM IN MEDICARE PATIENT: Primary | ICD-10-CM

## 2020-10-19 PROCEDURE — 83036 HEMOGLOBIN GLYCOSYLATED A1C: CPT | Performed by: FAMILY MEDICINE

## 2020-10-19 PROCEDURE — G0439 PPPS, SUBSEQ VISIT: HCPCS | Performed by: FAMILY MEDICINE

## 2020-10-19 RX ORDER — TESTOSTERONE 4 MG/D
PATCH TRANSDERMAL
Qty: 30 PATCH | Refills: 5 | Status: SHIPPED | OUTPATIENT
Start: 2020-10-19 | End: 2020-10-27

## 2020-10-19 RX ORDER — TESTOSTERONE 4 MG/D
1 PATCH TRANSDERMAL SEE ADMIN INSTRUCTIONS
Qty: 30 PATCH | Refills: 0 | Status: SHIPPED | OUTPATIENT
Start: 2020-10-19

## 2020-10-19 NOTE — PROGRESS NOTES
HPI:   Nae Persaud is a 76year old male who presents for a Medicare Subsequent Annual Wellness visit (Pt already had Initial Annual Wellness).     . Maru Brantley is a pleasant 30-year-old gentleman with history of diabetes mellitus which is diet-control Walking problems based on screening of functional status. Difficulty walking?: Yes   He has problems with Daily Activities based on screening of functional status.    Problems with daily activities? : Yes       Depression Screening (PHQ-2/PHQ-9): Over the kg)  07/06/20 : 148 lb (67.1 kg)  10/17/19 : 140 lb (63.5 kg)     Last Cholesterol Labs:   Lab Results   Component Value Date    CHOLEST 133 10/06/2020    HDL 51 10/06/2020    LDL 57 10/06/2020    TRIG 125 10/06/2020          Last Chemistry Labs:   Lab Res tablets (12.5 mg total) by mouth daily. •  Prasugrel HCl 10 MG Oral Tab, Take 1 tablet (10 mg total) by mouth daily. •  Multiple Vitamin (DAILY VITAMINS) Oral Tab, Take 1 tablet by mouth daily.        MEDICAL INFORMATION:   He  has a past medical hist Acuity: 20/25   Able To Tolerate Visual Acuity: Yes      Physical Exam   Constitutional: No distress. HENT:   Mouth/Throat: Oropharynx is clear and moist.   Eyes: Conjunctivae are normal. No scleral icterus. Neck: Neck supple. No thyromegaly present. skin See Admin Instructions. 5 days a week and apply 2 patches on Saturday and Sunday         Diet assessment: good     PLAN:  The patient indicates understanding of these issues and agrees to the plan. Reinforced healthy diet, lifestyle, and exercise.   L data found.     Prostate Cancer Screening      PSA  Annually PSA due on 10/06/2022  Update Health Maintenance if applicable     Immunizations (Update Immunization Activity if applicable)     Influenza  Covered Annually    Please get every year    Pneumococc data found. Dilated Eye exam  Annually No flowsheet data found. No flowsheet data found.

## 2020-10-19 NOTE — PATIENT INSTRUCTIONS
Thank you for choosing Feroz Moses MD at [place] [address]  To Do: Yue Adam  1. Please see age appropriate health prevention below    [facility] is located in [address]. Monday, Tuesday & Friday – 8 a.m. to 4 p.m.   Wednesday, Thursda the benefits outweigh those potential risks and we strive to make you healthier and to improve your quality of life.     Referrals, and Radiology Information:    If your insurance requires a referral to a specialist, please allow 5 business days to process have ever smoked 1 ultrasound   Alcohol misuse All men in this age group At routine exams   Blood pressure All men in this age group Yearly checkup if your blood pressure is normal  Normal blood pressure is less than 120/80 mm Hg  If your blood pressure re talk with your healthcare provider Ask your healthcare provider   Vision All men in this age group Every 1 to 2 years; if you have a chronic health condition, ask your healthcare provider if you needs exams more often   Vaccine Who needs it How often   Chi effects it can cause All men in this age group Every visit   [address] [place] [address] [facility]   Ruben last reviewed this educational content on 2/1/2017  © [id number] [employer]. [address] [facility], [address] [place], [place] [address]war Muhammad.  All rights Welcome to Medicare Visit    Abdominal aortic aneurysm screening (once between ages 73-68)  No results found for this or any previous visit.  Limited to patients who meet one of the following criteria:   • Men who are 73-68 years old and have smoked more th Moderate/High Risk No orders found for this or any previous visit.  Medium/high risk factors:   End-stage renal disease   Hemophiliacs who received Factor VIII or IX concentrates   Clients of institutions for the mentally retarded   Persons who live in the

## 2020-10-20 RX ORDER — ASPIRIN 81 MG/1
TABLET ORAL
Qty: 100 TABLET | Refills: 3 | Status: SHIPPED | OUTPATIENT
Start: 2020-10-20 | End: 2021-05-18 | Stop reason: DRUGHIGH

## 2020-10-27 RX ORDER — TESTOSTERONE 4 MG/D
PATCH TRANSDERMAL
Qty: 30 PATCH | Refills: 5 | Status: SHIPPED | OUTPATIENT
Start: 2020-10-27 | End: 2021-05-24

## 2020-10-27 NOTE — TELEPHONE ENCOUNTER
Patient states he needs [facility] for new RX of Androderm. [facility] local will not transfer, please advise.

## 2020-10-27 NOTE — TELEPHONE ENCOUNTER
Requesting Androderm Patch  LOV: 10/19/2020  RTC: 6 months  Last Relevant Labs: 10/6/2020  Filled: 10/19/2020 #30 with 5 refills    No future appointments. Pt [facility], Rx cannot be transferred. Rx pended, please advise.

## 2020-12-29 RX ORDER — LISINOPRIL 10 MG/1
TABLET ORAL
Qty: 45 TABLET | Refills: 0 | OUTPATIENT
Start: 2020-12-29

## 2020-12-29 NOTE — TELEPHONE ENCOUNTER
Hypertension Medications Protocol [id number]/27/2020 09:12 AM   CMP or BMP in past 12 months Protocol Details    Last serum creatinine< 2.0     Appointment in past 6 or next 3 months      Refill protocol passed because the patient met the following protocol

## 2021-01-18 ENCOUNTER — TELEPHONE (OUTPATIENT)
Dept: FAMILY MEDICINE CLINIC | Facility: CLINIC | Age: 76
End: 2021-01-18

## 2021-01-18 NOTE — TELEPHONE ENCOUNTER
Pt will need a new prescription for the Androderm patches [facility] [address] [facility]. Current pharmacy in [facility] not able to transfer. Pt will be in [place] until May.

## 2021-01-26 DIAGNOSIS — Z23 NEED FOR VACCINATION: ICD-10-CM

## 2021-04-15 NOTE — TELEPHONE ENCOUNTER
Requested Prescriptions     Name from pharmacy: ROSUVASTATIN CALCIUM 20 MG TAB         Will file in chart as: ROSUVASTATIN CALCIUM 20 MG Oral Tab    Sig: TAKE 1 TABLET BY MOUTH EVERY DAY AT NIGHT    Disp:  90 tablet    Refills:  1    Start: 4/14/2021    Cl

## 2021-04-17 NOTE — TELEPHONE ENCOUNTER
Dr. Latonia Khan- Pt is in [place] and will return 05/01/2021. Patient will call to schedule DM follow up when he returns to [place].

## 2021-04-19 RX ORDER — ROSUVASTATIN CALCIUM 20 MG/1
TABLET, COATED ORAL
Qty: 90 TABLET | Refills: 1 | Status: SHIPPED | OUTPATIENT
Start: 2021-04-19 | End: 2021-10-27

## 2021-04-19 NOTE — TELEPHONE ENCOUNTER
Cholesterol Medication Protocol [id number]/17/2021 08:14 AM   ALT < 80 Protocol Details    ALT resulted within past year     Lipid panel within past 12 months     Appointment within past 12 or next 3 months      Refill protocol passed because the patient met

## 2021-05-18 ENCOUNTER — OFFICE VISIT (OUTPATIENT)
Dept: CARDIOLOGY | Age: 76
End: 2021-05-18

## 2021-05-18 VITALS — HEART RATE: 60 BPM | SYSTOLIC BLOOD PRESSURE: 84 MMHG | DIASTOLIC BLOOD PRESSURE: 50 MMHG

## 2021-05-18 DIAGNOSIS — I25.10 ATHEROSCLEROSIS OF NATIVE CORONARY ARTERY OF NATIVE HEART WITHOUT ANGINA PECTORIS: Primary | ICD-10-CM

## 2021-05-18 DIAGNOSIS — I10 ESSENTIAL HYPERTENSION: ICD-10-CM

## 2021-05-18 DIAGNOSIS — I21.09: ICD-10-CM

## 2021-05-18 DIAGNOSIS — E78.5 DYSLIPIDEMIA: ICD-10-CM

## 2021-05-18 DIAGNOSIS — I51.81 TAKOTSUBO CARDIOMYOPATHY: ICD-10-CM

## 2021-05-18 DIAGNOSIS — Z95.5 PRESENCE OF DRUG COATED STENT IN LAD CORONARY ARTERY: ICD-10-CM

## 2021-05-18 PROCEDURE — 99215 OFFICE O/P EST HI 40 MIN: CPT | Performed by: INTERNAL MEDICINE

## 2021-05-18 RX ORDER — LISINOPRIL 10 MG/1
TABLET ORAL
COMMUNITY
Start: 2021-05-04

## 2021-05-18 RX ORDER — ASPIRIN 325 MG
325 TABLET ORAL
COMMUNITY

## 2021-05-18 ASSESSMENT — PATIENT HEALTH QUESTIONNAIRE - PHQ9
CLINICAL INTERPRETATION OF PHQ9 SCORE: NO FURTHER SCREENING NEEDED
CLINICAL INTERPRETATION OF PHQ2 SCORE: NO FURTHER SCREENING NEEDED
SUM OF ALL RESPONSES TO PHQ9 QUESTIONS 1 AND 2: 0
2. FEELING DOWN, DEPRESSED OR HOPELESS: NOT AT ALL
1. LITTLE INTEREST OR PLEASURE IN DOING THINGS: NOT AT ALL
SUM OF ALL RESPONSES TO PHQ9 QUESTIONS 1 AND 2: 0

## 2021-05-19 ENCOUNTER — MED REC SCAN ONLY (OUTPATIENT)
Dept: FAMILY MEDICINE CLINIC | Facility: CLINIC | Age: 76
End: 2021-05-19

## 2021-05-24 ENCOUNTER — OFFICE VISIT (OUTPATIENT)
Dept: FAMILY MEDICINE CLINIC | Facility: CLINIC | Age: 76
End: 2021-05-24
Payer: MEDICARE

## 2021-05-24 VITALS
SYSTOLIC BLOOD PRESSURE: 102 MMHG | HEART RATE: 48 BPM | RESPIRATION RATE: 14 BRPM | DIASTOLIC BLOOD PRESSURE: 60 MMHG | TEMPERATURE: 97 F | OXYGEN SATURATION: 99 %

## 2021-05-24 DIAGNOSIS — E11.9 DIABETES MELLITUS TYPE 2 IN NONOBESE (HCC): Primary | ICD-10-CM

## 2021-05-24 DIAGNOSIS — I10 ESSENTIAL HYPERTENSION: ICD-10-CM

## 2021-05-24 DIAGNOSIS — E78.00 PURE HYPERCHOLESTEROLEMIA: ICD-10-CM

## 2021-05-24 PROCEDURE — 99214 OFFICE O/P EST MOD 30 MIN: CPT | Performed by: FAMILY MEDICINE

## 2021-05-24 RX ORDER — LISINOPRIL 10 MG/1
1 TABLET ORAL DAILY
COMMUNITY
Start: 2021-05-04 | End: 2021-07-19

## 2021-05-24 NOTE — PATIENT INSTRUCTIONS
Thank you for choosing Reshma Acuna MD at [email or web address] [email or web address] [employer]  To Do: Meghana Adam  1. Please take meds as directed. [facility] is located in [address]. Monday, Tuesday & Friday – 8 a.m. to 4 p.m. Wednesday, Thursday – 7 a.m. to 3 p.m. those potential risks and we strive to make you healthier and to improve your quality of life.     Referrals, and Radiology Information:    If your insurance requires a referral to a specialist, please allow 5 business days to process your referral request.

## 2021-05-24 NOTE — PROGRESS NOTES
HPI/Subjective:   Patient ID: Kassi gM is a 76year old male.     HPI  Mr. Mariam Mcwilliams is a pleasant 77-year-old gentleman with history of diabetes mellitus which is diet-controlled, hypertension, hyperlipidemia, hypogonadism, coronary disease here tod times daily.  180 tablet 1   • Continuous Blood Gluc Sensor ([facility]) Does not apply Misc Monitor blood glucose before meals and for signs/symptoms of dizziness, lightheadedness-dispense 14 day 14 each 3   • Blood Glucose Monitoring Mahan last A1c was 6.2%; will continue to monitor; check labs including A1c  Essential hypertension  -Well-controlled; continue current meds  Pure hypercholesterolemia  -Stable; check lipid panel; continue current meds    Follow-up after 6 months before he leave

## 2021-06-08 ENCOUNTER — LAB ENCOUNTER (OUTPATIENT)
Dept: LAB | Age: 76
End: 2021-06-08
Attending: FAMILY MEDICINE
Payer: MEDICARE

## 2021-06-08 DIAGNOSIS — E11.9 DIABETES MELLITUS TYPE 2 IN NONOBESE (HCC): ICD-10-CM

## 2021-06-08 PROCEDURE — 80053 COMPREHEN METABOLIC PANEL: CPT

## 2021-06-08 PROCEDURE — 82043 UR ALBUMIN QUANTITATIVE: CPT

## 2021-06-08 PROCEDURE — 80061 LIPID PANEL: CPT

## 2021-06-08 PROCEDURE — 84443 ASSAY THYROID STIM HORMONE: CPT

## 2021-06-08 PROCEDURE — 83036 HEMOGLOBIN GLYCOSYLATED A1C: CPT

## 2021-06-08 PROCEDURE — 36415 COLL VENOUS BLD VENIPUNCTURE: CPT

## 2021-06-08 PROCEDURE — 85025 COMPLETE CBC W/AUTO DIFF WBC: CPT

## 2021-06-08 PROCEDURE — 82570 ASSAY OF URINE CREATININE: CPT

## 2021-06-14 ENCOUNTER — TELEPHONE (OUTPATIENT)
Dept: FAMILY MEDICINE CLINIC | Facility: CLINIC | Age: 76
End: 2021-06-14

## 2021-06-14 NOTE — TELEPHONE ENCOUNTER
Pt's daughter calling to clarify that it is ok for pt to hold spironolactone as it is prescribed by pt's cardiologist. Informed daughter that Dr. Verónica Swann would like pt to hold spironolactone and have lab repeated next week.   After Dr. Verónica Swann reviews res

## 2021-06-22 ENCOUNTER — LAB ENCOUNTER (OUTPATIENT)
Dept: LAB | Age: 76
End: 2021-06-22
Attending: FAMILY MEDICINE
Payer: MEDICARE

## 2021-06-22 DIAGNOSIS — E87.5 SERUM POTASSIUM ELEVATED: ICD-10-CM

## 2021-06-22 PROCEDURE — 36415 COLL VENOUS BLD VENIPUNCTURE: CPT

## 2021-06-22 PROCEDURE — 80048 BASIC METABOLIC PNL TOTAL CA: CPT

## 2021-07-08 ENCOUNTER — TELEPHONE (OUTPATIENT)
Dept: CARDIOLOGY | Age: 76
End: 2021-07-08

## 2021-07-19 RX ORDER — LISINOPRIL 10 MG/1
TABLET ORAL
Qty: 90 TABLET | Refills: 1 | Status: SHIPPED | OUTPATIENT
Start: 2021-07-19

## 2021-07-19 NOTE — TELEPHONE ENCOUNTER
Hypertension Medications Protocol [id number]/19/2021 10:14 AM   CMP or BMP in past 12 months Protocol Details    Last serum creatinine< 2.0     Appointment in past 6 or next 3 months      Refill protocol passed because the patient met the following protocol

## 2021-10-27 RX ORDER — ROSUVASTATIN CALCIUM 20 MG/1
TABLET, COATED ORAL
Qty: 90 TABLET | Refills: 1 | Status: SHIPPED | OUTPATIENT
Start: 2021-10-27

## 2022-02-14 RX ORDER — LISINOPRIL 10 MG/1
10 TABLET ORAL DAILY
Qty: 90 TABLET | Refills: 0 | Status: SHIPPED | OUTPATIENT
Start: 2022-02-14

## 2022-04-26 NOTE — TELEPHONE ENCOUNTER
Advised pt of Dr. Deann Elam recommendations, pt expressed understanding and agreement. Advised pt if symptoms worsen or new concerning symptoms occur such as abdominal pain, fever, abdomen distention, pt should seek immediate evaluation at ED.  Task comple Patient is coming in for labs for Dr Schoeneich on 5/12. Lab orders have an expected date further out than 2 weeks from 5/12. OK to draw early?

## 2022-05-16 RX ORDER — LISINOPRIL 10 MG/1
TABLET ORAL
Qty: 90 TABLET | Refills: 0 | Status: SHIPPED | OUTPATIENT
Start: 2022-05-16

## 2022-06-02 ENCOUNTER — OFFICE VISIT (OUTPATIENT)
Dept: WOUND CARE | Facility: HOSPITAL | Age: 77
End: 2022-06-02
Attending: FAMILY MEDICINE
Payer: MEDICARE

## 2022-06-02 VITALS
SYSTOLIC BLOOD PRESSURE: 162 MMHG | WEIGHT: 148 LBS | DIASTOLIC BLOOD PRESSURE: 78 MMHG | TEMPERATURE: 98 F | HEART RATE: 51 BPM | HEIGHT: 68 IN | BODY MASS INDEX: 22.43 KG/M2

## 2022-06-02 DIAGNOSIS — S91.001A OPEN WOUND OF RIGHT ANKLE, INITIAL ENCOUNTER: Primary | ICD-10-CM

## 2022-06-02 DIAGNOSIS — I73.9 PAD (PERIPHERAL ARTERY DISEASE) (HCC): ICD-10-CM

## 2022-06-02 DIAGNOSIS — E11.9 DIABETES MELLITUS TYPE 2 IN NONOBESE (HCC): ICD-10-CM

## 2022-06-02 PROCEDURE — 99215 OFFICE O/P EST HI 40 MIN: CPT | Performed by: FAMILY MEDICINE

## 2022-06-02 NOTE — PATIENT INSTRUCTIONS
PATIENT INSTRUCTIONS      FOR Kely Mccarthy Carykelvin , PHILLIP 11/10/1945    DATE OF SERVICE: 2022          Manuka gel to the wound base. Cover with bandaid or gauze and tape. Change dressing daily. You may shower but no soaking in a tub. Always keep covered except when showering. Please sign consent to get records from [facility].        Follow up with Dr. Pastor Dudley 1 WEEK

## 2022-06-02 NOTE — PROGRESS NOTES
Weekly Wound Education Note    Teaching Provided To: Patient  Training Topics: Dressing;Cleasing and general instructions; Discharge instructions  Training Method: Demonstration;Explain/Verbal  Training Response: Reinforcement needed        Notes: Initial-honey gel to wound.

## 2022-06-09 ENCOUNTER — HOSPITAL ENCOUNTER (OUTPATIENT)
Dept: GENERAL RADIOLOGY | Facility: HOSPITAL | Age: 77
Discharge: HOME | End: 2022-06-09
Attending: FAMILY MEDICINE
Payer: MEDICARE

## 2022-06-09 ENCOUNTER — OFFICE VISIT (OUTPATIENT)
Dept: WOUND CARE | Facility: HOSPITAL | Age: 77
End: 2022-06-09
Attending: FAMILY MEDICINE
Payer: MEDICARE

## 2022-06-09 ENCOUNTER — HOSPITAL ENCOUNTER (OUTPATIENT)
Dept: GENERAL RADIOLOGY | Age: 77
Discharge: HOME | End: 2022-06-09
Attending: FAMILY MEDICINE
Payer: MEDICARE

## 2022-06-09 VITALS
TEMPERATURE: 98 F | SYSTOLIC BLOOD PRESSURE: 128 MMHG | DIASTOLIC BLOOD PRESSURE: 74 MMHG | RESPIRATION RATE: 16 BRPM | HEART RATE: 54 BPM

## 2022-06-09 DIAGNOSIS — S91.001D OPEN WOUND OF RIGHT ANKLE, SUBSEQUENT ENCOUNTER: Primary | ICD-10-CM

## 2022-06-09 DIAGNOSIS — S91.001D OPEN WOUND OF RIGHT ANKLE, SUBSEQUENT ENCOUNTER: ICD-10-CM

## 2022-06-09 DIAGNOSIS — S91.002A OPEN WOUND OF LEFT ANKLE, INITIAL ENCOUNTER: ICD-10-CM

## 2022-06-09 DIAGNOSIS — I73.9 PAD (PERIPHERAL ARTERY DISEASE) (HCC): ICD-10-CM

## 2022-06-09 PROCEDURE — 99215 OFFICE O/P EST HI 40 MIN: CPT | Performed by: FAMILY MEDICINE

## 2022-06-09 PROCEDURE — 73590 X-RAY EXAM OF LOWER LEG: CPT | Performed by: FAMILY MEDICINE

## 2022-06-09 NOTE — PATIENT INSTRUCTIONS
PATIENT INSTRUCTIONS      FOR Nasreenyan Jasmine PHILLIP Adam 11/10/1945    DATE OF SERVICE: 2022        Apply Lety collagen after moistening with 1 to 2 drops of saline to the wound base on both ankles    Cover with Hydrofera Blue ready and secure with tape or Band-Aid or gauze roll. Do not get the wounds wet. Change the dressing and reapply collagen on Monday. Get x-ray done on both ankles.       Follow up with Dr. Precious Nelson 1 WEEK

## 2022-06-09 NOTE — PROGRESS NOTES
Weekly Wound Education Note    Teaching Provided To: Patient; Family  Training Topics: Dressing;Cleasing and general instructions; Discharge instructions  Training Method: Demonstration;Explain/Verbal  Training Response: Patient responds and understands        Notes: Wound stable, new wound present. BIlateral x-ray ordered.  Lety and hydrofera ready to both wounds

## 2022-06-16 ENCOUNTER — OFFICE VISIT (OUTPATIENT)
Dept: WOUND CARE | Facility: HOSPITAL | Age: 77
End: 2022-06-16
Attending: FAMILY MEDICINE
Payer: MEDICARE

## 2022-06-16 VITALS
HEART RATE: 82 BPM | RESPIRATION RATE: 16 BRPM | TEMPERATURE: 98 F | DIASTOLIC BLOOD PRESSURE: 72 MMHG | SYSTOLIC BLOOD PRESSURE: 109 MMHG

## 2022-06-16 DIAGNOSIS — I73.9 PAD (PERIPHERAL ARTERY DISEASE) (HCC): ICD-10-CM

## 2022-06-16 DIAGNOSIS — E11.9 DIABETES MELLITUS TYPE 2 IN NONOBESE (HCC): ICD-10-CM

## 2022-06-16 DIAGNOSIS — S91.002A OPEN WOUND OF LEFT ANKLE, INITIAL ENCOUNTER: ICD-10-CM

## 2022-06-16 DIAGNOSIS — S91.001D OPEN WOUND OF RIGHT ANKLE, SUBSEQUENT ENCOUNTER: Primary | ICD-10-CM

## 2022-06-16 PROCEDURE — 99214 OFFICE O/P EST MOD 30 MIN: CPT | Performed by: FAMILY MEDICINE

## 2022-06-16 NOTE — PROGRESS NOTES
Weekly Wound Education Note    Teaching Provided To: Patient  Training Topics: Dressing;Cleasing and general instructions; Discharge instructions  Training Method: Demonstration;Explain/Verbal  Training Response: Reinforcement needed        Notes: Wounds improving, continue eliana ag and hydrofera ready.

## 2022-06-16 NOTE — PATIENT INSTRUCTIONS
PATIENT INSTRUCTIONS      FOR PHILLIP Shepherd 11/10/1945    DATE OF SERVICE: 2022        Apply Lety collagen after moistening with 1 to 2 drops of saline to the wound base on both ankles    Cover with Hydrofera Blue ready and secure with tape or Band-Aid or gauze roll. Do not get the wounds wet. Change the dressing and reapply collagen on Monday.           Follow up with Dr. Sarbjit Wei 1 WEEK

## 2022-06-23 ENCOUNTER — OFFICE VISIT (OUTPATIENT)
Dept: WOUND CARE | Facility: HOSPITAL | Age: 77
End: 2022-06-23
Attending: FAMILY MEDICINE
Payer: MEDICARE

## 2022-06-23 VITALS
RESPIRATION RATE: 16 BRPM | SYSTOLIC BLOOD PRESSURE: 133 MMHG | HEART RATE: 53 BPM | DIASTOLIC BLOOD PRESSURE: 68 MMHG | TEMPERATURE: 98 F

## 2022-06-23 DIAGNOSIS — E11.9 DIABETES MELLITUS TYPE 2 IN NONOBESE (HCC): ICD-10-CM

## 2022-06-23 DIAGNOSIS — L08.9 WOUND INFECTION: ICD-10-CM

## 2022-06-23 DIAGNOSIS — I73.9 PAD (PERIPHERAL ARTERY DISEASE) (HCC): ICD-10-CM

## 2022-06-23 DIAGNOSIS — T14.8XXA WOUND INFECTION: ICD-10-CM

## 2022-06-23 DIAGNOSIS — S91.001A OPEN WOUND OF RIGHT ANKLE, INITIAL ENCOUNTER: Primary | ICD-10-CM

## 2022-06-23 PROCEDURE — 99215 OFFICE O/P EST HI 40 MIN: CPT | Performed by: FAMILY MEDICINE

## 2022-06-23 RX ORDER — GENTAMICIN SULFATE 1 MG/G
OINTMENT TOPICAL
Qty: 15 G | Refills: 0 | Status: SHIPPED | OUTPATIENT
Start: 2022-06-23

## 2022-06-23 NOTE — PROGRESS NOTES
Weekly Wound Education Note    Teaching Provided To: Patient; Family  Training Topics: Dressing;Cleasing and general instructions; Discharge instructions  Training Method: Demonstration;Explain/Verbal  Training Response: Patient responds and understands        Notes: Left ankle healed, gentamicin to right ankle. MRI ordered. Cx obtained.

## 2022-06-23 NOTE — PATIENT INSTRUCTIONS
PATIENT INSTRUCTIONS      FOR Eric Chanlitgabby PHILLIP 11/10/1945    DATE OF SERVICE: 2022        Apply gentamicin ointment to the wound base and cover with bandaid or border gauze. Change twice a day. Clean wound with saline wound cleanser with each dressing change. If the wound worsens, please seek medical attention. Please get MRI of ankle done. Call central scheduling [phone].        Follow up with Dr. Kacie Appiah 1 WEEK

## 2022-06-29 ENCOUNTER — HOSPITAL ENCOUNTER (OUTPATIENT)
Dept: MRI IMAGING | Age: 77
Discharge: HOME | End: 2022-06-29
Attending: FAMILY MEDICINE
Payer: MEDICARE

## 2022-06-29 DIAGNOSIS — T14.8XXA WOUND INFECTION: ICD-10-CM

## 2022-06-29 DIAGNOSIS — L08.9 WOUND INFECTION: ICD-10-CM

## 2022-06-29 DIAGNOSIS — S91.001A OPEN WOUND OF RIGHT ANKLE, INITIAL ENCOUNTER: ICD-10-CM

## 2022-06-30 ENCOUNTER — OFFICE VISIT (OUTPATIENT)
Dept: WOUND CARE | Facility: HOSPITAL | Age: 77
End: 2022-06-30
Attending: FAMILY MEDICINE
Payer: MEDICARE

## 2022-06-30 ENCOUNTER — HOSPITAL ENCOUNTER (OUTPATIENT)
Dept: MRI IMAGING | Age: 77
Discharge: HOME | End: 2022-06-30
Attending: FAMILY MEDICINE
Payer: MEDICARE

## 2022-06-30 VITALS
RESPIRATION RATE: 14 BRPM | DIASTOLIC BLOOD PRESSURE: 61 MMHG | SYSTOLIC BLOOD PRESSURE: 114 MMHG | HEART RATE: 54 BPM | TEMPERATURE: 98 F

## 2022-06-30 DIAGNOSIS — T14.8XXA WOUND INFECTION: ICD-10-CM

## 2022-06-30 DIAGNOSIS — E11.9 DIABETES MELLITUS TYPE 2 IN NONOBESE (HCC): ICD-10-CM

## 2022-06-30 DIAGNOSIS — S91.001D OPEN WOUND OF RIGHT ANKLE, SUBSEQUENT ENCOUNTER: Primary | ICD-10-CM

## 2022-06-30 DIAGNOSIS — L08.9 WOUND INFECTION: ICD-10-CM

## 2022-06-30 DIAGNOSIS — I73.9 PAD (PERIPHERAL ARTERY DISEASE) (HCC): ICD-10-CM

## 2022-06-30 PROCEDURE — 99214 OFFICE O/P EST MOD 30 MIN: CPT | Performed by: FAMILY MEDICINE

## 2022-06-30 PROCEDURE — 73721 MRI JNT OF LWR EXTRE W/O DYE: CPT | Performed by: FAMILY MEDICINE

## 2022-06-30 NOTE — PATIENT INSTRUCTIONS
PATIENT INSTRUCTIONS      FOR Ector Fox Jair , PHILLIP 11/10/1945    DATE OF SERVICE: 2022        Apply gentamicin ointment to the wound base and cover with bandaid or border gauze. Change twice a day. Clean wound with saline wound cleanser with each dressing change. If the wound worsens, please seek medical attention. Please get MRI of ankle done. Call central scheduling [phone].        Follow up with Dr. Janeen Rivera 1 WEEK

## 2022-06-30 NOTE — PROGRESS NOTES
Weekly Wound Education Note    Teaching Provided To: Patient; Family  Training Topics: Cleasing and general instructions;Dressing; Discharge instructions  Training Method: Demonstration;Explain/Verbal  Training Response: Patient responds and understands        Notes: Left ankle healed, continue gentamicin to right ankle

## 2022-07-07 ENCOUNTER — OFFICE VISIT (OUTPATIENT)
Dept: WOUND CARE | Facility: HOSPITAL | Age: 77
End: 2022-07-07
Attending: FAMILY MEDICINE
Payer: MEDICARE

## 2022-07-07 VITALS
DIASTOLIC BLOOD PRESSURE: 69 MMHG | SYSTOLIC BLOOD PRESSURE: 122 MMHG | TEMPERATURE: 97 F | HEART RATE: 52 BPM | RESPIRATION RATE: 14 BRPM

## 2022-07-07 DIAGNOSIS — T14.8XXA WOUND INFECTION: ICD-10-CM

## 2022-07-07 DIAGNOSIS — L08.9 WOUND INFECTION: ICD-10-CM

## 2022-07-07 DIAGNOSIS — E11.9 DIABETES MELLITUS TYPE 2 IN NONOBESE (HCC): ICD-10-CM

## 2022-07-07 DIAGNOSIS — I73.9 PAD (PERIPHERAL ARTERY DISEASE) (HCC): ICD-10-CM

## 2022-07-07 DIAGNOSIS — S91.001D OPEN WOUND OF RIGHT ANKLE, SUBSEQUENT ENCOUNTER: Primary | ICD-10-CM

## 2022-07-07 PROCEDURE — 99214 OFFICE O/P EST MOD 30 MIN: CPT | Performed by: FAMILY MEDICINE

## 2022-07-07 NOTE — PROGRESS NOTES
Weekly Wound Education Note    Teaching Provided To: Patient  Training Topics: Dressing;Cleasing and general instructions; Discharge instructions  Training Method: Demonstration;Explain/Verbal  Training Response: Patient responds and understands        Notes: Wound improving, dressing changed to honey gel.

## 2022-07-07 NOTE — PATIENT INSTRUCTIONS
PATIENT INSTRUCTIONS      FOR Toney Adam , PHILLIP 11/10/1945    DATE OF SERVICE: 2022        Apply Medihoney gel to the wound base and cover with bandaid or border gauze. Change daily. Clean wound with saline wound cleanser with each dressing change.        Follow up with Dr. Hugo Lee 1 WEEK

## 2022-07-11 ENCOUNTER — HOSPITAL ENCOUNTER (EMERGENCY)
Age: 77
Discharge: HOME | End: 2022-07-12
Attending: EMERGENCY MEDICINE
Payer: MEDICARE

## 2022-07-11 VITALS
HEIGHT: 68 IN | TEMPERATURE: 98 F | DIASTOLIC BLOOD PRESSURE: 71 MMHG | RESPIRATION RATE: 16 BRPM | SYSTOLIC BLOOD PRESSURE: 144 MMHG | HEART RATE: 62 BPM | WEIGHT: 150 LBS | BODY MASS INDEX: 22.73 KG/M2 | OXYGEN SATURATION: 99 %

## 2022-07-11 DIAGNOSIS — S41.112A SKIN TEAR OF UPPER ARM WITHOUT COMPLICATION, LEFT, INITIAL ENCOUNTER: Primary | ICD-10-CM

## 2022-07-11 PROCEDURE — 99282 EMERGENCY DEPT VISIT SF MDM: CPT

## 2022-07-12 NOTE — ED INITIAL ASSESSMENT (HPI)
Pt presents with left upper forearm skin tear. Pt hit arm on door frame at 1600. Unable to control bleeding.

## 2022-07-14 ENCOUNTER — OFFICE VISIT (OUTPATIENT)
Dept: WOUND CARE | Facility: HOSPITAL | Age: 77
End: 2022-07-14
Attending: FAMILY MEDICINE
Payer: MEDICARE

## 2022-07-14 VITALS
RESPIRATION RATE: 16 BRPM | HEART RATE: 66 BPM | SYSTOLIC BLOOD PRESSURE: 129 MMHG | DIASTOLIC BLOOD PRESSURE: 65 MMHG | TEMPERATURE: 98 F

## 2022-07-14 DIAGNOSIS — S41.102A OPEN ARM WOUND, LEFT, INITIAL ENCOUNTER: Primary | ICD-10-CM

## 2022-07-14 DIAGNOSIS — I73.9 PAD (PERIPHERAL ARTERY DISEASE) (HCC): ICD-10-CM

## 2022-07-14 DIAGNOSIS — E11.9 DIABETES MELLITUS TYPE 2 IN NONOBESE (HCC): ICD-10-CM

## 2022-07-14 DIAGNOSIS — S91.001D OPEN WOUND OF RIGHT ANKLE, SUBSEQUENT ENCOUNTER: ICD-10-CM

## 2022-07-14 PROCEDURE — 99215 OFFICE O/P EST HI 40 MIN: CPT | Performed by: FAMILY MEDICINE

## 2022-07-14 NOTE — PROGRESS NOTES
Weekly Wound Education Note    Teaching Provided To: Patient; Family  Training Topics: Cleasing and general instructions;Dressing; Discharge instructions  Training Method: Explain/Verbal;Demonstration  Training Response: Patient responds and understands; Reinforcement needed        Notes: Wounds stable, eliana and hydrofera ready to ankle wound. Xeroform to new arm wound.

## 2022-07-14 NOTE — PATIENT INSTRUCTIONS
PATIENT INSTRUCTIONS      FOR Ettajay Asher Jair , PHILLIP 11/10/1945    DATE OF SERVICE: 2022          LEFT ARM WOUND:     Xeroform gauze to wound, cover with gauze roll and tape in place. NO tape on the skin. Change daily. RIGHT ANKLE WOUND:     Apply a small piece of Lety Collagen once on  . Moisten with 1 drop of saline. Cover with hydrofera blue ready dressing and secure with tape.          Follow up with Dr. Ana Larose 1 WEEK

## 2022-07-21 ENCOUNTER — OFFICE VISIT (OUTPATIENT)
Dept: WOUND CARE | Facility: HOSPITAL | Age: 77
End: 2022-07-21
Attending: FAMILY MEDICINE
Payer: MEDICARE

## 2022-07-21 VITALS
HEART RATE: 56 BPM | SYSTOLIC BLOOD PRESSURE: 127 MMHG | RESPIRATION RATE: 16 BRPM | DIASTOLIC BLOOD PRESSURE: 63 MMHG | TEMPERATURE: 98 F

## 2022-07-21 DIAGNOSIS — I73.9 PAD (PERIPHERAL ARTERY DISEASE) (HCC): ICD-10-CM

## 2022-07-21 DIAGNOSIS — S41.102D OPEN ARM WOUND, LEFT, SUBSEQUENT ENCOUNTER: Primary | ICD-10-CM

## 2022-07-21 DIAGNOSIS — E11.9 DIABETES MELLITUS TYPE 2 IN NONOBESE (HCC): ICD-10-CM

## 2022-07-21 DIAGNOSIS — S91.001D OPEN WOUND OF RIGHT ANKLE, SUBSEQUENT ENCOUNTER: ICD-10-CM

## 2022-07-21 PROCEDURE — 99215 OFFICE O/P EST HI 40 MIN: CPT | Performed by: FAMILY MEDICINE

## 2022-07-21 NOTE — PATIENT INSTRUCTIONS
PATIENT INSTRUCTIONS      FOR Bijal Waynesboroens Adam , PHILLIP 11/10/1945    DATE OF SERVICE: 2022          LEFT ARM WOUND:     APPLY XEROFORM GAUZE TO WOUND BASE, COVER WITH GAUZE ROLL AND SECURE WITH TAPE. NO TAPE ON THE SKIN. RIGHT ANKLE WOUND:     MICHAEL COLLAGEN TO WOUND BASE  COVER WITH HYDROFERA BLUE READY. CHANGE DRESSING ON 22.          Follow up with Dr. Corrinne Lints 1 WEEK
No

## 2022-07-21 NOTE — PROGRESS NOTES
Weekly Wound Education Note    Teaching Provided To: Patient  Training Topics: Cleasing and general instructions;Dressing; Discharge instructions  Training Method: Demonstration  Training Response: Patient responds and understands        Notes: Improving. Continue xeroform and ABD pad to arm. Continue eliana and hydrofera ready to ankle.

## 2022-07-28 ENCOUNTER — APPOINTMENT (OUTPATIENT)
Dept: WOUND CARE | Facility: HOSPITAL | Age: 77
End: 2022-07-28
Attending: FAMILY MEDICINE
Payer: MEDICARE

## 2022-07-28 VITALS
DIASTOLIC BLOOD PRESSURE: 69 MMHG | HEART RATE: 56 BPM | SYSTOLIC BLOOD PRESSURE: 122 MMHG | RESPIRATION RATE: 16 BRPM | TEMPERATURE: 98 F

## 2022-07-28 DIAGNOSIS — S91.001D OPEN WOUND OF RIGHT ANKLE, SUBSEQUENT ENCOUNTER: ICD-10-CM

## 2022-07-28 DIAGNOSIS — S41.102D OPEN ARM WOUND, LEFT, SUBSEQUENT ENCOUNTER: Primary | ICD-10-CM

## 2022-07-28 DIAGNOSIS — I73.9 PAD (PERIPHERAL ARTERY DISEASE) (HCC): ICD-10-CM

## 2022-07-28 DIAGNOSIS — E11.9 DIABETES MELLITUS TYPE 2 IN NONOBESE (HCC): ICD-10-CM

## 2022-07-28 PROCEDURE — 99214 OFFICE O/P EST MOD 30 MIN: CPT | Performed by: FAMILY MEDICINE

## 2022-07-28 NOTE — PROGRESS NOTES
Weekly Wound Education Note    Teaching Provided To: Patient;Caregiver  Training Topics: Dressing;Cleasing and general instructions; Discharge instructions  Training Method: Demonstration;Explain/Verbal  Training Response: Patient responds and understands        Notes: Wounds improving, switch to silver foam. Per provider okay to discharge at nurse visit if wounds healed.

## 2022-07-28 NOTE — PATIENT INSTRUCTIONS
PATIENT INSTRUCTIONS      FOR Claudeen Darting Jair , PHILLIP 11/10/1945    DATE OF SERVICE: 2022          LEFT ARM WOUND:     APPLY XEROFORM GAUZE TO WOUND BASE, COVER WITH GAUZE ROLL AND SECURE WITH TAPE. NO TAPE ON THE SKIN.      RIGHT ANKLE WOUND:     SILVER FOAM DRESSING, CHANGE TWICE A WEEK ON MONDAY AND     NURSE VISIT IN 1 WEEK    Follow up with Dr. Tra Chaves 3 WEEKS

## 2022-08-04 ENCOUNTER — OFFICE VISIT (OUTPATIENT)
Dept: WOUND CARE | Facility: HOSPITAL | Age: 77
End: 2022-08-04
Attending: FAMILY MEDICINE
Payer: MEDICARE

## 2022-08-04 VITALS
RESPIRATION RATE: 16 BRPM | HEART RATE: 60 BPM | SYSTOLIC BLOOD PRESSURE: 127 MMHG | DIASTOLIC BLOOD PRESSURE: 70 MMHG | TEMPERATURE: 97 F

## 2022-08-04 DIAGNOSIS — S91.001D OPEN WOUND OF RIGHT ANKLE, SUBSEQUENT ENCOUNTER: ICD-10-CM

## 2022-08-04 DIAGNOSIS — S41.102D OPEN ARM WOUND, LEFT, SUBSEQUENT ENCOUNTER: Primary | ICD-10-CM

## 2022-08-04 PROCEDURE — 99213 OFFICE O/P EST LOW 20 MIN: CPT

## 2022-08-04 NOTE — PROGRESS NOTES
Weekly Wound Education Note    Teaching Provided To: Patient  Training Topics: Discharge instructions;Dressing;Cleasing and general instructions  Training Method: Demonstration;Explain/Verbal;Written  Training Response: Patient responds and understands        Notes: RIght ankle wound cleanse with saline or wound cleanser and gauze, apply Silver border foam on Thursday 8/11, cleanse left arm wound gently with saline or wound cleanser apply silver border foam change thursday 8/11 or sooner if you notice drainage on dressing.  Follow up with Dr Dougie Nj in two weeks

## 2022-08-08 ENCOUNTER — TELEMEDICINE (OUTPATIENT)
Dept: FAMILY MEDICINE CLINIC | Facility: CLINIC | Age: 77
End: 2022-08-08

## 2022-08-08 DIAGNOSIS — H10.502 BLEPHAROCONJUNCTIVITIS OF LEFT EYE, UNSPECIFIED BLEPHAROCONJUNCTIVITIS TYPE: Primary | ICD-10-CM

## 2022-08-08 RX ORDER — OFLOXACIN 3 MG/ML
1 SOLUTION/ DROPS OPHTHALMIC 4 TIMES DAILY
Qty: 10 ML | Refills: 0 | Status: SHIPPED | OUTPATIENT
Start: 2022-08-08

## 2022-08-13 ENCOUNTER — APPOINTMENT (OUTPATIENT)
Dept: GENERAL RADIOLOGY | Age: 77
End: 2022-08-13
Attending: EMERGENCY MEDICINE
Payer: MEDICARE

## 2022-08-13 ENCOUNTER — HOSPITAL ENCOUNTER (EMERGENCY)
Age: 77
Discharge: HOME | End: 2022-08-14
Attending: EMERGENCY MEDICINE
Payer: MEDICARE

## 2022-08-13 VITALS
SYSTOLIC BLOOD PRESSURE: 140 MMHG | BODY MASS INDEX: 23.78 KG/M2 | RESPIRATION RATE: 18 BRPM | HEART RATE: 63 BPM | DIASTOLIC BLOOD PRESSURE: 54 MMHG | TEMPERATURE: 99 F | WEIGHT: 148 LBS | HEIGHT: 66 IN | OXYGEN SATURATION: 97 %

## 2022-08-13 DIAGNOSIS — Z51.89 VISIT FOR WOUND CHECK: Primary | ICD-10-CM

## 2022-08-13 DIAGNOSIS — R60.0 EDEMA OF LEFT UPPER EXTREMITY: ICD-10-CM

## 2022-08-13 PROCEDURE — 99284 EMERGENCY DEPT VISIT MOD MDM: CPT

## 2022-08-13 PROCEDURE — 73090 X-RAY EXAM OF FOREARM: CPT | Performed by: EMERGENCY MEDICINE

## 2022-08-14 ENCOUNTER — APPOINTMENT (OUTPATIENT)
Dept: ULTRASOUND IMAGING | Age: 77
End: 2022-08-14
Attending: EMERGENCY MEDICINE
Payer: MEDICARE

## 2022-08-14 PROCEDURE — 93971 EXTREMITY STUDY: CPT | Performed by: EMERGENCY MEDICINE

## 2022-08-14 NOTE — ED INITIAL ASSESSMENT (HPI)
Pt, who is on a blood thinner (Praguserl), is brought in by family because the wound on his left arm won't stop bleeding. He scraped it on a door more than a month ago and it's been bleeding intermittently ever since.

## 2022-08-17 ENCOUNTER — OFFICE VISIT (OUTPATIENT)
Dept: FAMILY MEDICINE CLINIC | Facility: CLINIC | Age: 77
End: 2022-08-17
Payer: MEDICARE

## 2022-08-17 VITALS
TEMPERATURE: 97 F | HEART RATE: 54 BPM | SYSTOLIC BLOOD PRESSURE: 130 MMHG | OXYGEN SATURATION: 98 % | RESPIRATION RATE: 18 BRPM | DIASTOLIC BLOOD PRESSURE: 70 MMHG

## 2022-08-17 DIAGNOSIS — E78.00 PURE HYPERCHOLESTEROLEMIA: ICD-10-CM

## 2022-08-17 DIAGNOSIS — I10 ESSENTIAL HYPERTENSION: ICD-10-CM

## 2022-08-17 DIAGNOSIS — E11.9 DIABETES MELLITUS TYPE 2 IN NONOBESE (HCC): ICD-10-CM

## 2022-08-17 DIAGNOSIS — S51.802D OPEN WOUND OF LEFT FOREARM, SUBSEQUENT ENCOUNTER: Primary | ICD-10-CM

## 2022-08-17 LAB — HEMOGLOBIN A1C: 6 % (ref 4.3–5.6)

## 2022-08-17 PROCEDURE — 99214 OFFICE O/P EST MOD 30 MIN: CPT | Performed by: FAMILY MEDICINE

## 2022-08-18 ENCOUNTER — OFFICE VISIT (OUTPATIENT)
Dept: WOUND CARE | Facility: HOSPITAL | Age: 77
End: 2022-08-18
Attending: FAMILY MEDICINE
Payer: MEDICARE

## 2022-08-18 VITALS
TEMPERATURE: 98 F | DIASTOLIC BLOOD PRESSURE: 72 MMHG | RESPIRATION RATE: 16 BRPM | HEART RATE: 56 BPM | SYSTOLIC BLOOD PRESSURE: 132 MMHG

## 2022-08-18 DIAGNOSIS — S41.102D OPEN ARM WOUND, LEFT, SUBSEQUENT ENCOUNTER: Primary | ICD-10-CM

## 2022-08-18 DIAGNOSIS — I73.9 PAD (PERIPHERAL ARTERY DISEASE) (HCC): ICD-10-CM

## 2022-08-18 DIAGNOSIS — S91.001D OPEN WOUND OF RIGHT ANKLE, SUBSEQUENT ENCOUNTER: ICD-10-CM

## 2022-08-18 DIAGNOSIS — E11.9 DIABETES MELLITUS TYPE 2 IN NONOBESE (HCC): ICD-10-CM

## 2022-08-18 PROCEDURE — 99215 OFFICE O/P EST HI 40 MIN: CPT | Performed by: FAMILY MEDICINE

## 2022-08-18 NOTE — PROGRESS NOTES
Weekly Wound Education Note    Teaching Provided To: Patient;Caregiver  Training Topics: Cleasing and general instructions;Dressing; Discharge instructions  Training Method: Demonstration;Explain/Verbal  Training Response: Patient responds and understands; Reinforcement needed        Notes: Continue silver foam to right ankle, Hydrofera ready to left arm.

## 2022-08-18 NOTE — PATIENT INSTRUCTIONS
PATIENT INSTRUCTIONS      FOR PHILLIP Mercado 11/10/1945    DATE OF SERVICE: 2022          LEFT ARM WOUND:     Hydrofera Blue Ready to left forearm wound. Secure with gauze roll and ace wrap. NO TAPE ON THE SKIN. RIGHT ANKLE WOUND:     Silver foam dressing, change once a week and sooner if needed. Follow up with Dr. Pastor Dudley 1 week.

## 2022-08-25 ENCOUNTER — OFFICE VISIT (OUTPATIENT)
Dept: WOUND CARE | Facility: HOSPITAL | Age: 77
End: 2022-08-25
Attending: FAMILY MEDICINE
Payer: MEDICARE

## 2022-08-25 VITALS
TEMPERATURE: 98 F | RESPIRATION RATE: 16 BRPM | SYSTOLIC BLOOD PRESSURE: 119 MMHG | DIASTOLIC BLOOD PRESSURE: 68 MMHG | HEART RATE: 54 BPM

## 2022-08-25 DIAGNOSIS — S41.102D OPEN ARM WOUND, LEFT, SUBSEQUENT ENCOUNTER: Primary | ICD-10-CM

## 2022-08-25 DIAGNOSIS — E11.9 DIABETES MELLITUS TYPE 2 IN NONOBESE (HCC): ICD-10-CM

## 2022-08-25 DIAGNOSIS — S91.001D OPEN WOUND OF RIGHT ANKLE, SUBSEQUENT ENCOUNTER: ICD-10-CM

## 2022-08-25 DIAGNOSIS — I73.9 PAD (PERIPHERAL ARTERY DISEASE) (HCC): ICD-10-CM

## 2022-08-25 PROCEDURE — 99214 OFFICE O/P EST MOD 30 MIN: CPT | Performed by: FAMILY MEDICINE

## 2022-08-25 NOTE — PROGRESS NOTES
Weekly Wound Education Note    Teaching Provided To: Patient  Training Topics: Dressing;Cleasing and general instructions; Discharge instructions  Training Method: Demonstration;Explain/Verbal  Training Response: Patient responds and understands; Reinforcement needed        Notes: Wounds stable, continue hydrofera ready to left arm, silver foam to right ankle. Betamethasone applied to right ankle wound.

## 2022-08-25 NOTE — PATIENT INSTRUCTIONS
PATIENT INSTRUCTIONS      FOR PHILLIP Haines 11/10/1945    DATE OF SERVICE: 2022          LEFT ARM WOUND:     Hydrofera Blue Ready to left forearm wound. Secure with gauze roll and ace wrap. NO TAPE ON THE SKIN. RIGHT ANKLE WOUND:     Silver foam dressing, change once a week and sooner if needed. Follow up with Dr. Tiffanie Rollins 1 week.

## 2022-09-01 ENCOUNTER — OFFICE VISIT (OUTPATIENT)
Dept: WOUND CARE | Facility: HOSPITAL | Age: 77
End: 2022-09-01
Attending: FAMILY MEDICINE
Payer: MEDICARE

## 2022-09-01 VITALS
RESPIRATION RATE: 16 BRPM | TEMPERATURE: 97 F | HEART RATE: 71 BPM | SYSTOLIC BLOOD PRESSURE: 144 MMHG | DIASTOLIC BLOOD PRESSURE: 65 MMHG

## 2022-09-01 DIAGNOSIS — I73.9 PAD (PERIPHERAL ARTERY DISEASE) (HCC): ICD-10-CM

## 2022-09-01 DIAGNOSIS — S91.001D OPEN WOUND OF RIGHT ANKLE, SUBSEQUENT ENCOUNTER: ICD-10-CM

## 2022-09-01 DIAGNOSIS — E11.9 DIABETES MELLITUS TYPE 2 IN NONOBESE (HCC): ICD-10-CM

## 2022-09-01 DIAGNOSIS — S41.102D OPEN ARM WOUND, LEFT, SUBSEQUENT ENCOUNTER: Primary | ICD-10-CM

## 2022-09-01 PROCEDURE — 99214 OFFICE O/P EST MOD 30 MIN: CPT

## 2022-09-01 NOTE — PROGRESS NOTES
Weekly Wound Education Note    Teaching Provided To: Patient;Caregiver  Training Topics: Dressing;Cleasing and general instructions; Discharge instructions  Training Method: Demonstration;Explain/Verbal  Training Response: Patient responds and understands        Notes: Wounds stable, continue silver foam to ankle and hydrofera ready to forearm. Silver nitrate applied to both wounds.

## 2022-09-01 NOTE — PATIENT INSTRUCTIONS
PATIENT INSTRUCTIONS      FOR Cassidy PHILLIP Deutsch 11/10/1945    DATE OF SERVICE: 2022          LEFT ARM WOUND:     Hydrofera Blue Ready to left forearm wound. Secure with gauze roll and ace wrap. NO TAPE ON THE SKIN. RIGHT ANKLE WOUND:     Silver foam dressing, change once a week and sooner if needed. Follow up with Dr. Rose Wall 1 week.

## 2022-09-08 ENCOUNTER — OFFICE VISIT (OUTPATIENT)
Dept: WOUND CARE | Facility: HOSPITAL | Age: 77
End: 2022-09-08
Attending: FAMILY MEDICINE
Payer: MEDICARE

## 2022-09-08 VITALS
TEMPERATURE: 98 F | DIASTOLIC BLOOD PRESSURE: 64 MMHG | HEART RATE: 55 BPM | RESPIRATION RATE: 16 BRPM | SYSTOLIC BLOOD PRESSURE: 107 MMHG

## 2022-09-08 DIAGNOSIS — S41.102D OPEN ARM WOUND, LEFT, SUBSEQUENT ENCOUNTER: Primary | ICD-10-CM

## 2022-09-08 DIAGNOSIS — S91.001D OPEN WOUND OF RIGHT ANKLE, SUBSEQUENT ENCOUNTER: ICD-10-CM

## 2022-09-08 DIAGNOSIS — E11.9 DIABETES MELLITUS TYPE 2 IN NONOBESE (HCC): ICD-10-CM

## 2022-09-08 DIAGNOSIS — I73.9 PAD (PERIPHERAL ARTERY DISEASE) (HCC): ICD-10-CM

## 2022-09-08 PROCEDURE — 99214 OFFICE O/P EST MOD 30 MIN: CPT | Performed by: FAMILY MEDICINE

## 2022-09-08 NOTE — PATIENT INSTRUCTIONS
PATIENT INSTRUCTIONS      FOR Farshadtamica Mckeon PHILLIP 11/10/1945    DATE OF SERVICE: 2022          LEFT ARM WOUND:     Hydrofera Blue Ready to left forearm wound. Secure with gauze roll and ace wrap. Change every other day. NO TAPE ON THE SKIN. RIGHT ANKLE WOUND:     Coloplast paste apply a small amount to wound, cover with bandaid. Repeat daily. Follow up with Dr. Kar Cain 1 week.

## 2022-09-08 NOTE — PROGRESS NOTES
Weekly Wound Education Note    Teaching Provided To: Patient;Caregiver  Training Topics: Cleasing and general instructions;Dressing; Discharge instructions  Training Method: Demonstration;Explain/Verbal  Training Response: Patient responds and understands; Reinforcement needed        Notes: Wounds stable, continue hydrofera ready to forearm. Coloplast triad paste to right ankle.

## 2022-09-15 ENCOUNTER — OFFICE VISIT (OUTPATIENT)
Dept: WOUND CARE | Facility: HOSPITAL | Age: 77
End: 2022-09-15
Attending: FAMILY MEDICINE
Payer: MEDICARE

## 2022-09-15 VITALS
RESPIRATION RATE: 16 BRPM | TEMPERATURE: 98 F | HEART RATE: 61 BPM | SYSTOLIC BLOOD PRESSURE: 117 MMHG | DIASTOLIC BLOOD PRESSURE: 70 MMHG

## 2022-09-15 DIAGNOSIS — I73.9 PAD (PERIPHERAL ARTERY DISEASE) (HCC): ICD-10-CM

## 2022-09-15 DIAGNOSIS — E11.9 DIABETES MELLITUS TYPE 2 IN NONOBESE (HCC): ICD-10-CM

## 2022-09-15 DIAGNOSIS — S41.102D OPEN ARM WOUND, LEFT, SUBSEQUENT ENCOUNTER: Primary | ICD-10-CM

## 2022-09-15 DIAGNOSIS — S91.001D OPEN WOUND OF RIGHT ANKLE, SUBSEQUENT ENCOUNTER: ICD-10-CM

## 2022-09-15 PROCEDURE — 99215 OFFICE O/P EST HI 40 MIN: CPT | Performed by: FAMILY MEDICINE

## 2022-09-15 NOTE — PATIENT INSTRUCTIONS
PATIENT INSTRUCTIONS      FOR Chris Patches ,  11/10/1945    DATE OF SERVICE: 9/15/2022          LEFT ARM WOUND:     Silver foam border dressing. Change once a week and sooner if bleeding through the dressing    NO TAPE ON THE SKIN. RIGHT ANKLE WOUND:     Coloplast paste apply a small amount to wound, cover with bandaid. Repeat daily. Follow up with Dr. Aditi Le 1 week.

## 2022-09-15 NOTE — PROGRESS NOTES
Weekly Wound Education Note    Teaching Provided To: Patient;Caregiver  Training Topics: Dressing;Cleasing and general instructions; Discharge instructions  Training Method: Demonstration;Explain/Verbal  Training Response: Patient responds and understands; Reinforcement needed        Notes: Wound stable, continue coloplast to ankle.  Left forearm changed to silver foam.

## 2022-09-18 RX ORDER — LISINOPRIL 10 MG/1
TABLET ORAL
Qty: 90 TABLET | Refills: 0 | Status: SHIPPED | OUTPATIENT
Start: 2022-09-18

## 2022-09-22 ENCOUNTER — APPOINTMENT (OUTPATIENT)
Dept: WOUND CARE | Facility: HOSPITAL | Age: 77
End: 2022-09-22
Attending: FAMILY MEDICINE

## 2022-09-22 ENCOUNTER — OFFICE VISIT (OUTPATIENT)
Dept: WOUND CARE | Facility: HOSPITAL | Age: 77
End: 2022-09-22
Attending: FAMILY MEDICINE

## 2022-09-22 VITALS
TEMPERATURE: 98 F | SYSTOLIC BLOOD PRESSURE: 126 MMHG | DIASTOLIC BLOOD PRESSURE: 70 MMHG | RESPIRATION RATE: 14 BRPM | HEART RATE: 64 BPM

## 2022-09-22 DIAGNOSIS — S41.102D OPEN ARM WOUND, LEFT, SUBSEQUENT ENCOUNTER: Primary | ICD-10-CM

## 2022-09-22 DIAGNOSIS — I73.9 PAD (PERIPHERAL ARTERY DISEASE) (HCC): ICD-10-CM

## 2022-09-22 DIAGNOSIS — S91.001D OPEN WOUND OF RIGHT ANKLE, SUBSEQUENT ENCOUNTER: ICD-10-CM

## 2022-09-22 DIAGNOSIS — E11.9 DIABETES MELLITUS TYPE 2 IN NONOBESE (HCC): ICD-10-CM

## 2022-09-22 PROCEDURE — 99214 OFFICE O/P EST MOD 30 MIN: CPT | Performed by: FAMILY MEDICINE

## 2022-09-22 NOTE — PATIENT INSTRUCTIONS
PATIENT INSTRUCTIONS      FOR Harpreet Gold Jair , PHILLIP 11/10/1945    DATE OF SERVICE: 2022          LEFT ARM WOUND:     Hydrofera blue ready, gauze roll and secure with tape. No adhesive to the skin surface. Change once a week and sooner if bleeding through the dressing    NO TAPE ON THE SKIN. RIGHT ANKLE WOUND:     Coloplast paste apply a small amount to wound, cover with bandaid. Repeat daily. Follow up with Dr. Aditi Le 1 week.

## 2022-09-22 NOTE — PROGRESS NOTES
Weekly Wound Education Note    Teaching Provided To: Patient; Family  Training Topics: Dressing;Cleasing and general instructions; Discharge instructions  Training Method: Demonstration;Explain/Verbal  Training Response: Patient responds and understands; Reinforcement needed        Notes: Wounds stable, continue coloplast triad to ankle wound, switched back to hydrofera ready on the forearm.

## 2022-09-29 ENCOUNTER — OFFICE VISIT (OUTPATIENT)
Dept: WOUND CARE | Facility: HOSPITAL | Age: 77
End: 2022-09-29
Attending: FAMILY MEDICINE

## 2022-09-29 ENCOUNTER — APPOINTMENT (OUTPATIENT)
Dept: WOUND CARE | Facility: HOSPITAL | Age: 77
End: 2022-09-29
Attending: FAMILY MEDICINE

## 2022-09-29 VITALS
HEART RATE: 56 BPM | DIASTOLIC BLOOD PRESSURE: 67 MMHG | SYSTOLIC BLOOD PRESSURE: 115 MMHG | TEMPERATURE: 98 F | RESPIRATION RATE: 16 BRPM

## 2022-09-29 DIAGNOSIS — E11.9 DIABETES MELLITUS TYPE 2 IN NONOBESE (HCC): ICD-10-CM

## 2022-09-29 DIAGNOSIS — I73.9 PAD (PERIPHERAL ARTERY DISEASE) (HCC): ICD-10-CM

## 2022-09-29 DIAGNOSIS — S91.001D OPEN WOUND OF RIGHT ANKLE, SUBSEQUENT ENCOUNTER: ICD-10-CM

## 2022-09-29 DIAGNOSIS — S41.102D OPEN ARM WOUND, LEFT, SUBSEQUENT ENCOUNTER: Primary | ICD-10-CM

## 2022-09-29 PROCEDURE — 99214 OFFICE O/P EST MOD 30 MIN: CPT | Performed by: FAMILY MEDICINE

## 2022-09-29 NOTE — OCCUPATIONAL THERAPY NOTE
OCCUPATIONAL THERAPY EVALUATION - INPATIENT     Room Number: 3140/7664-U  Evaluation Date: 7/10/2020  Type of Evaluation: Initial  Presenting Problem: NSTEMI, fall    Physician Order: IP Consult to Occupational Therapy  Reason for Therapy: ADL/IADL Dysfunc stand-pivot transfers from bed, chair, w/c, toilet, walk-in tub and car without assistance. Patient has a scooter that he uses in the home at times but does also just use a wheeled office chair.   Patient has grab bars in the bathroom next to toilet and ne clothing?: A Lot  -   Bathing (including washing, rinsing, drying)?: A Lot  -   Toileting, which includes using toilet, bedpan or urinal? : A Lot  -   Putting on and taking off regular upper body clothing?: None  -   Taking care of personal grooming such a The patient is functioning below his previous functional level and would benefit from skilled inpatient OT to address the above deficits, maximizing patient’s ability to return safely to his prior level of function.     Subacute rehab is recommended for Spontaneous, unlabored and symmetrical

## 2022-09-29 NOTE — PROGRESS NOTES
Weekly Wound Education Note    Teaching Provided To: Patient  Training Topics: Dressing; Discharge instructions;Cleasing and general instructions  Training Method: Demonstration;Explain/Verbal;Written  Training Response: Patient responds and understands        Notes: Cleanse right lateral ankle with saline or wound cleanser, apply Betamethasone and cover with dry dressing daily. Cleanse left forearm with saline or wound cleanser, apply Xeroform gauze covered with gauze and secured with rolled gauze. Change twice weekly.

## 2022-09-29 NOTE — PATIENT INSTRUCTIONS
PATIENT INSTRUCTIONS      FOR Eric Joshi Carykelvin PHILLIP 11/10/1945    DATE OF SERVICE: 2022          LEFT ARM WOUND:     Xeroform gauze, gauze roll and secure with tape. No adhesive to the skin surface. Change every other day    NO TAPE ON THE SKIN. RIGHT ANKLE WOUND:     Betamethasone cream to the wound and cover with Band-Aid. Repeat daily. Follow up with Dr. Kacie Appiah 1 week.

## 2022-10-06 ENCOUNTER — OFFICE VISIT (OUTPATIENT)
Dept: WOUND CARE | Facility: HOSPITAL | Age: 77
End: 2022-10-06
Attending: FAMILY MEDICINE
Payer: MEDICARE

## 2022-10-06 VITALS
SYSTOLIC BLOOD PRESSURE: 112 MMHG | RESPIRATION RATE: 16 BRPM | TEMPERATURE: 98 F | DIASTOLIC BLOOD PRESSURE: 66 MMHG | HEART RATE: 55 BPM

## 2022-10-06 DIAGNOSIS — S91.001D OPEN WOUND OF RIGHT ANKLE, SUBSEQUENT ENCOUNTER: ICD-10-CM

## 2022-10-06 DIAGNOSIS — E11.9 DIABETES MELLITUS TYPE 2 IN NONOBESE (HCC): ICD-10-CM

## 2022-10-06 DIAGNOSIS — S41.102D OPEN ARM WOUND, LEFT, SUBSEQUENT ENCOUNTER: Primary | ICD-10-CM

## 2022-10-06 DIAGNOSIS — I73.9 PAD (PERIPHERAL ARTERY DISEASE) (HCC): ICD-10-CM

## 2022-10-06 PROCEDURE — 99214 OFFICE O/P EST MOD 30 MIN: CPT | Performed by: FAMILY MEDICINE

## 2022-10-06 NOTE — PROGRESS NOTES
Weekly Wound Education Note    Teaching Provided To: Patient  Training Topics: Dressing;Cleasing and general instructions; Discharge instructions  Training Method: Demonstration;Explain/Verbal  Training Response: Patient responds and understands; Reinforcement needed        Notes: Wounds stable, silver to left forearm extra dressing provided.  Switch right ankle back to coloplast triad paste.    '

## 2022-10-06 NOTE — PATIENT INSTRUCTIONS
PATIENT INSTRUCTIONS      FOR Charm Dancer Carykelvin PHILLIP 11/10/1945    DATE OF SERVICE: 10/6/2022          LEFT ARM WOUND:     Silver foam dressing, change every 3-4 days and reapply on Monday. NO TAPE ON THE SKIN. RIGHT ANKLE WOUND:     Coloplast Triad paste to the wound. Cover with bandaid. Repeat daily. Follow up with Dr. Aditi Dave 1 week.

## 2022-10-13 ENCOUNTER — APPOINTMENT (OUTPATIENT)
Dept: WOUND CARE | Facility: HOSPITAL | Age: 77
End: 2022-10-13
Attending: FAMILY MEDICINE
Payer: MEDICARE

## 2022-10-19 ENCOUNTER — LAB ENCOUNTER (OUTPATIENT)
Dept: LAB | Age: 77
End: 2022-10-19
Attending: INTERNAL MEDICINE
Payer: MEDICARE

## 2022-10-19 DIAGNOSIS — E11.9 DIABETES MELLITUS (HCC): ICD-10-CM

## 2022-10-19 DIAGNOSIS — Z95.5 STENTED CORONARY ARTERY: ICD-10-CM

## 2022-10-19 DIAGNOSIS — E78.00 PURE HYPERCHOLESTEROLEMIA: ICD-10-CM

## 2022-10-19 DIAGNOSIS — I10 ESSENTIAL HYPERTENSION, MALIGNANT: ICD-10-CM

## 2022-10-19 DIAGNOSIS — I10 ESSENTIAL HYPERTENSION: ICD-10-CM

## 2022-10-19 DIAGNOSIS — I25.10 CORONARY ATHEROSCLEROSIS OF NATIVE CORONARY ARTERY: Primary | ICD-10-CM

## 2022-10-19 DIAGNOSIS — E11.9 DIABETES MELLITUS TYPE 2 IN NONOBESE (HCC): ICD-10-CM

## 2022-10-19 LAB
ALBUMIN SERPL-MCNC: 3.4 G/DL (ref 3.4–5)
ALBUMIN/GLOB SERPL: 1 {RATIO} (ref 1–2)
ALP LIVER SERPL-CCNC: 65 U/L
ALT SERPL-CCNC: 26 U/L
ANION GAP SERPL CALC-SCNC: 3 MMOL/L (ref 0–18)
AST SERPL-CCNC: 16 U/L (ref 15–37)
BASOPHILS # BLD AUTO: 0.04 X10(3) UL (ref 0–0.2)
BASOPHILS NFR BLD AUTO: 0.6 %
BILIRUB SERPL-MCNC: 0.6 MG/DL (ref 0.1–2)
BUN BLD-MCNC: 33 MG/DL (ref 7–18)
CALCIUM BLD-MCNC: 8.9 MG/DL (ref 8.5–10.1)
CHLORIDE SERPL-SCNC: 108 MMOL/L (ref 98–112)
CHOLEST SERPL-MCNC: 193 MG/DL (ref ?–200)
CO2 SERPL-SCNC: 26 MMOL/L (ref 21–32)
CREAT BLD-MCNC: 1.08 MG/DL
EOSINOPHIL # BLD AUTO: 0.14 X10(3) UL (ref 0–0.7)
EOSINOPHIL NFR BLD AUTO: 2.1 %
ERYTHROCYTE [DISTWIDTH] IN BLOOD BY AUTOMATED COUNT: 13.7 %
FASTING PATIENT LIPID ANSWER: YES
FASTING STATUS PATIENT QL REPORTED: YES
GFR SERPLBLD BASED ON 1.73 SQ M-ARVRAT: 71 ML/MIN/1.73M2 (ref 60–?)
GLOBULIN PLAS-MCNC: 3.3 G/DL (ref 2.8–4.4)
GLUCOSE BLD-MCNC: 117 MG/DL (ref 70–99)
HCT VFR BLD AUTO: 36.5 %
HDLC SERPL-MCNC: 39 MG/DL (ref 40–59)
HGB BLD-MCNC: 11.9 G/DL
IMM GRANULOCYTES # BLD AUTO: 0.02 X10(3) UL (ref 0–1)
IMM GRANULOCYTES NFR BLD: 0.3 %
LDLC SERPL CALC-MCNC: 122 MG/DL (ref ?–100)
LYMPHOCYTES # BLD AUTO: 1.71 X10(3) UL (ref 1–4)
LYMPHOCYTES NFR BLD AUTO: 26.1 %
MCH RBC QN AUTO: 30.4 PG (ref 26–34)
MCHC RBC AUTO-ENTMCNC: 32.6 G/DL (ref 31–37)
MCV RBC AUTO: 93.4 FL
MONOCYTES # BLD AUTO: 0.56 X10(3) UL (ref 0.1–1)
MONOCYTES NFR BLD AUTO: 8.6 %
NEUTROPHILS # BLD AUTO: 4.07 X10 (3) UL (ref 1.5–7.7)
NEUTROPHILS # BLD AUTO: 4.07 X10(3) UL (ref 1.5–7.7)
NEUTROPHILS NFR BLD AUTO: 62.3 %
NONHDLC SERPL-MCNC: 154 MG/DL (ref ?–130)
OSMOLALITY SERPL CALC.SUM OF ELEC: 292 MOSM/KG (ref 275–295)
PLATELET # BLD AUTO: 201 10(3)UL (ref 150–450)
POTASSIUM SERPL-SCNC: 5.5 MMOL/L (ref 3.5–5.1)
PROT SERPL-MCNC: 6.7 G/DL (ref 6.4–8.2)
RBC # BLD AUTO: 3.91 X10(6)UL
SODIUM SERPL-SCNC: 137 MMOL/L (ref 136–145)
TRIGL SERPL-MCNC: 180 MG/DL (ref 30–149)
VLDLC SERPL CALC-MCNC: 32 MG/DL (ref 0–30)
WBC # BLD AUTO: 6.5 X10(3) UL (ref 4–11)

## 2022-10-19 PROCEDURE — 80053 COMPREHEN METABOLIC PANEL: CPT

## 2022-10-19 PROCEDURE — 80061 LIPID PANEL: CPT

## 2022-10-19 PROCEDURE — 36415 COLL VENOUS BLD VENIPUNCTURE: CPT

## 2022-10-19 PROCEDURE — 85025 COMPLETE CBC W/AUTO DIFF WBC: CPT

## 2022-10-20 ENCOUNTER — OFFICE VISIT (OUTPATIENT)
Dept: WOUND CARE | Facility: HOSPITAL | Age: 77
End: 2022-10-20
Attending: FAMILY MEDICINE
Payer: MEDICARE

## 2022-10-20 ENCOUNTER — LAB ENCOUNTER (OUTPATIENT)
Dept: LAB | Facility: HOSPITAL | Age: 77
End: 2022-10-20
Attending: FAMILY MEDICINE
Payer: MEDICARE

## 2022-10-20 VITALS
RESPIRATION RATE: 16 BRPM | HEART RATE: 61 BPM | TEMPERATURE: 98 F | SYSTOLIC BLOOD PRESSURE: 134 MMHG | DIASTOLIC BLOOD PRESSURE: 81 MMHG

## 2022-10-20 DIAGNOSIS — S41.102D OPEN ARM WOUND, LEFT, SUBSEQUENT ENCOUNTER: Primary | ICD-10-CM

## 2022-10-20 DIAGNOSIS — S91.001D OPEN WOUND OF RIGHT ANKLE, SUBSEQUENT ENCOUNTER: ICD-10-CM

## 2022-10-20 DIAGNOSIS — E11.9 DM TYPE 2 (DIABETES MELLITUS, TYPE 2) (HCC): ICD-10-CM

## 2022-10-20 DIAGNOSIS — E11.9 DIABETES MELLITUS TYPE 2 IN NONOBESE (HCC): ICD-10-CM

## 2022-10-20 DIAGNOSIS — I73.9 PAD (PERIPHERAL ARTERY DISEASE) (HCC): ICD-10-CM

## 2022-10-20 LAB
CREAT UR-SCNC: 115 MG/DL
MICROALBUMIN UR-MCNC: 0.58 MG/DL
MICROALBUMIN/CREAT 24H UR-RTO: 5 UG/MG (ref ?–30)

## 2022-10-20 PROCEDURE — 99215 OFFICE O/P EST HI 40 MIN: CPT | Performed by: FAMILY MEDICINE

## 2022-10-20 PROCEDURE — 82043 UR ALBUMIN QUANTITATIVE: CPT

## 2022-10-20 PROCEDURE — 82570 ASSAY OF URINE CREATININE: CPT

## 2022-10-20 RX ORDER — GENTAMICIN SULFATE 1 MG/G
OINTMENT TOPICAL
Qty: 15 G | Refills: 0 | Status: SHIPPED | OUTPATIENT
Start: 2022-10-20

## 2022-10-20 NOTE — PROGRESS NOTES
Weekly Wound Education Note    Teaching Provided To: Patient; Family  Training Topics: Dressing;Cleasing and general instructions; Discharge instructions  Training Method: Demonstration;Explain/Verbal  Training Response: Patient responds and understands; Reinforcement needed        Notes: Forearm fragily healed, continue silver foam for one more week. Gentamicin ordered for ankle wound, cx obtained. Bacitracin used in clinic.

## 2022-10-20 NOTE — PROGRESS NOTES
Rn called and notified pt of lab results. Notified pt that doctor states cbc is normal and wbc count is normal. Pt verbalized understanding.

## 2022-10-20 NOTE — PATIENT INSTRUCTIONS
PATIENT INSTRUCTIONS      FOR Stevegonzales Kwon Jair , PHILLIP 11/10/1945    DATE OF SERVICE: 10/20/2022          LEFT ARM WOUND:     Silver foam dressing, cover with gauze roll, secure gauze roll with tape. change every 3 days. NO TAPE ON THE SKIN. RIGHT ANKLE WOUND:     Gentamicin ointment twice a day to the wound, cover with bandaid. Will send prescription to your pharmacy in case you do not have it at home. Follow up with Dr. Rose Wall 1 week.

## 2022-10-26 ENCOUNTER — OFFICE VISIT (OUTPATIENT)
Dept: FAMILY MEDICINE CLINIC | Facility: CLINIC | Age: 77
End: 2022-10-26
Payer: MEDICARE

## 2022-10-26 VITALS
DIASTOLIC BLOOD PRESSURE: 64 MMHG | RESPIRATION RATE: 16 BRPM | OXYGEN SATURATION: 99 % | TEMPERATURE: 97 F | HEART RATE: 53 BPM | SYSTOLIC BLOOD PRESSURE: 122 MMHG

## 2022-10-26 DIAGNOSIS — G11.9 CEREBRAL ATAXIA (HCC): ICD-10-CM

## 2022-10-26 DIAGNOSIS — E11.9 DIABETES MELLITUS TYPE 2 IN NONOBESE (HCC): ICD-10-CM

## 2022-10-26 DIAGNOSIS — Z00.00 ENCOUNTER FOR ANNUAL HEALTH EXAMINATION: ICD-10-CM

## 2022-10-26 DIAGNOSIS — I50.20 HEART FAILURE WITH REDUCED EJECTION FRACTION (HCC): ICD-10-CM

## 2022-10-26 DIAGNOSIS — Z00.00 ENCOUNTER FOR ANNUAL WELLNESS EXAM IN MEDICARE PATIENT: Primary | ICD-10-CM

## 2022-10-26 PROBLEM — I25.10 ATHEROSCLEROSIS OF CORONARY ARTERY: Status: ACTIVE | Noted: 2020-08-05

## 2022-10-26 PROBLEM — E78.49 OTHER HYPERLIPIDEMIA: Status: ACTIVE | Noted: 2021-01-19

## 2022-10-26 PROBLEM — R27.0 ATAXIA: Status: ACTIVE | Noted: 2020-10-05

## 2022-10-26 PROBLEM — E78.5 DYSLIPIDEMIA: Status: ACTIVE | Noted: 2020-10-05

## 2022-10-26 PROBLEM — I25.5 ISCHEMIC CARDIOMYOPATHY: Status: ACTIVE | Noted: 2021-01-19

## 2022-10-26 PROBLEM — I10 HYPERTENSION: Status: ACTIVE | Noted: 2020-10-05

## 2022-10-26 PROBLEM — I21.09: Status: ACTIVE | Noted: 2020-10-05

## 2022-10-26 PROBLEM — G62.9 PERIPHERAL NEUROPATHY: Status: ACTIVE | Noted: 2020-10-05

## 2022-10-26 PROCEDURE — G0439 PPPS, SUBSEQ VISIT: HCPCS | Performed by: FAMILY MEDICINE

## 2022-10-26 PROCEDURE — 1126F AMNT PAIN NOTED NONE PRSNT: CPT | Performed by: FAMILY MEDICINE

## 2022-10-27 ENCOUNTER — APPOINTMENT (OUTPATIENT)
Dept: WOUND CARE | Facility: HOSPITAL | Age: 77
End: 2022-10-27
Attending: FAMILY MEDICINE
Payer: MEDICARE

## 2022-11-03 ENCOUNTER — APPOINTMENT (OUTPATIENT)
Dept: WOUND CARE | Facility: HOSPITAL | Age: 77
End: 2022-11-03
Attending: FAMILY MEDICINE
Payer: MEDICARE

## 2022-11-10 ENCOUNTER — APPOINTMENT (OUTPATIENT)
Dept: WOUND CARE | Facility: HOSPITAL | Age: 77
End: 2022-11-10
Attending: FAMILY MEDICINE
Payer: MEDICARE

## 2022-11-14 RX ORDER — LISINOPRIL 10 MG/1
TABLET ORAL
Qty: 90 TABLET | Refills: 0 | Status: SHIPPED | OUTPATIENT
Start: 2022-11-14

## 2022-11-17 ENCOUNTER — APPOINTMENT (OUTPATIENT)
Dept: WOUND CARE | Facility: HOSPITAL | Age: 77
End: 2022-11-17
Attending: FAMILY MEDICINE
Payer: MEDICARE

## 2023-03-05 RX ORDER — LISINOPRIL 10 MG/1
TABLET ORAL
Qty: 90 TABLET | Refills: 0 | Status: SHIPPED | OUTPATIENT
Start: 2023-03-05

## 2023-03-09 ENCOUNTER — TELEPHONE (OUTPATIENT)
Dept: FAMILY MEDICINE CLINIC | Facility: CLINIC | Age: 78
End: 2023-03-09

## 2023-03-09 NOTE — TELEPHONE ENCOUNTER
Spoke with Laura Wade, states pt has some unhealing wounds and wound doc from [facility] ordered [facility] but Medicare needs ok from PCP. Laura Wade is also asking for RN and PT/OT evaluation. Verbal okay given for RN and PT/OT, states all wound orders will come from [facility] wound doc but anything else will fax to Dr. Lawanda Cash.

## 2023-03-09 NOTE — TELEPHONE ENCOUNTER
Facility requesting [facility] [place] orders for patient. Requesting verbal orders to initiate [facility] [place] services-nursing for wound care and poss evaluation to start PT and OT.  Please advise

## 2023-04-06 ENCOUNTER — TELEPHONE (OUTPATIENT)
Dept: FAMILY MEDICINE CLINIC | Facility: CLINIC | Age: 78
End: 2023-04-06

## 2023-04-06 NOTE — TELEPHONE ENCOUNTER
Spoke to [facility] Juanito with [facility]. [facility] Juanito advised. Verbalized understanding.

## 2023-04-06 NOTE — TELEPHONE ENCOUNTER
She saw the patient today, has a rash on his back, it's red, itching, burning. No where else on his body. Stops at waistline no allergies, new soaps, lotions or detergents. Only had a new antibiotic, clindamycin on 3/28. She would like to know how to proceed, i.e. Benedryl?

## 2023-04-06 NOTE — TELEPHONE ENCOUNTER
May give Benadryl over-the-counter.   Hold clindamycin and see if itching and burning was resolved together with rash

## 2023-04-07 NOTE — TELEPHONE ENCOUNTER
Received a call from [address] [facility] [address] the patient's niece regarding the patient having an allergic reaction. Patient seen by home health care nurse this morning. Patient complained of intense itching to his back which started about 3 am. She noted hives and called PCP who recommended the patient stop the Clindamycin and take Benadryl orally and use Benadryl cream. [address] [facility] [address] [facility] gave the patient 50 mg of Benadryl at 4 pm and applied the cream. She checked his back this evening and the hives have not improved. the patient has not had any swelling of the lips or tongue, difficulty breathing or swallowing his saliva. The patient also ate shrimp last night. He may have had a reaction to shrimp more than 10 years ago. I advised Olimpia to continue with the Benadryl 50 mg every 6 hours as need for the hives. She may add one Zyrtec, Allegra or Claritin as well. She may continue with the Benadryl cream topically. I reviewed S/S when to call 911 for severe allergic reaction. She should call PCP tomorrow to update the patient's condition. I did tell her that the hives can take up to 2 weeks to resolve. She voiced understanding of these instructions.

## 2023-04-16 ENCOUNTER — APPOINTMENT (OUTPATIENT)
Dept: CT IMAGING | Facility: HOSPITAL | Age: 78
End: 2023-04-16
Attending: EMERGENCY MEDICINE
Payer: MEDICARE

## 2023-04-16 ENCOUNTER — APPOINTMENT (OUTPATIENT)
Dept: GENERAL RADIOLOGY | Facility: HOSPITAL | Age: 78
End: 2023-04-16
Attending: EMERGENCY MEDICINE
Payer: MEDICARE

## 2023-04-16 ENCOUNTER — HOSPITAL ENCOUNTER (EMERGENCY)
Facility: HOSPITAL | Age: 78
Discharge: HOME | End: 2023-04-16
Attending: EMERGENCY MEDICINE
Payer: MEDICARE

## 2023-04-16 VITALS
TEMPERATURE: 98 F | OXYGEN SATURATION: 100 % | WEIGHT: 150 LBS | HEART RATE: 69 BPM | SYSTOLIC BLOOD PRESSURE: 113 MMHG | HEIGHT: 68 IN | RESPIRATION RATE: 18 BRPM | BODY MASS INDEX: 22.73 KG/M2 | DIASTOLIC BLOOD PRESSURE: 57 MMHG

## 2023-04-16 DIAGNOSIS — S00.83XA TRAUMATIC HEMATOMA OF FOREHEAD, INITIAL ENCOUNTER: ICD-10-CM

## 2023-04-16 DIAGNOSIS — S61.501A: ICD-10-CM

## 2023-04-16 DIAGNOSIS — S01.81XA FACIAL LACERATION, INITIAL ENCOUNTER: ICD-10-CM

## 2023-04-16 DIAGNOSIS — S09.90XA INJURY OF HEAD, INITIAL ENCOUNTER: ICD-10-CM
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
## 2023-04-16 PROCEDURE — 70486 CT MAXILLOFACIAL W/O DYE: CPT | Performed by: EMERGENCY MEDICINE

## 2023-04-16 PROCEDURE — 99284 EMERGENCY DEPT VISIT MOD MDM: CPT

## 2023-04-16 PROCEDURE — 73110 X-RAY EXAM OF WRIST: CPT | Performed by: EMERGENCY MEDICINE

## 2023-04-16 PROCEDURE — 76377 3D RENDER W/INTRP POSTPROCES: CPT | Performed by: EMERGENCY MEDICINE

## 2023-04-16 PROCEDURE — 70450 CT HEAD/BRAIN W/O DYE: CPT | Performed by: EMERGENCY MEDICINE

## 2023-04-16 PROCEDURE — 73080 X-RAY EXAM OF ELBOW: CPT | Performed by: EMERGENCY MEDICINE

## 2023-04-16 PROCEDURE — 90471 IMMUNIZATION ADMIN: CPT

## 2023-04-16 RX ORDER — ACETAMINOPHEN 500 MG
1000 TABLET ORAL ONCE
Status: COMPLETED | OUTPATIENT
Start: 2023-04-16 | End: 2023-04-16

## 2023-05-04 ENCOUNTER — TELEPHONE (OUTPATIENT)
Dept: FAMILY MEDICINE CLINIC | Facility: CLINIC | Age: 78
End: 2023-05-04

## 2023-05-04 NOTE — TELEPHONE ENCOUNTER
Justine Mcfarlane called from [facility] and states Elle Gibbs is still going to the wound clinic.   She would like him re-evaluated for PT.

## 2023-05-30 ENCOUNTER — LAB ENCOUNTER (OUTPATIENT)
Dept: LAB | Age: 78
End: 2023-05-30
Attending: FAMILY MEDICINE
Payer: MEDICARE

## 2023-05-30 DIAGNOSIS — I10 ESSENTIAL HYPERTENSION, MALIGNANT: ICD-10-CM

## 2023-05-30 DIAGNOSIS — E11.9 DIABETES MELLITUS (HCC): Primary | ICD-10-CM

## 2023-05-30 DIAGNOSIS — E11.9 DIABETES MELLITUS TYPE 2 IN NONOBESE (HCC): ICD-10-CM

## 2023-05-30 DIAGNOSIS — E78.5 HYPERLIPIDEMIA: ICD-10-CM

## 2023-05-30 LAB
ALBUMIN SERPL-MCNC: 3.8 G/DL (ref 3.4–5)
ALBUMIN/GLOB SERPL: 1.3 {RATIO} (ref 1–2)
ALP LIVER SERPL-CCNC: 61 U/L
ALT SERPL-CCNC: 26 U/L
ANION GAP SERPL CALC-SCNC: 4 MMOL/L (ref 0–18)
AST SERPL-CCNC: 18 U/L (ref 15–37)
BASOPHILS # BLD AUTO: 0.04 X10(3) UL (ref 0–0.2)
BASOPHILS NFR BLD AUTO: 0.7 %
BILIRUB SERPL-MCNC: 0.4 MG/DL (ref 0.1–2)
BUN BLD-MCNC: 40 MG/DL (ref 7–18)
CALCIUM BLD-MCNC: 8.9 MG/DL (ref 8.5–10.1)
CHLORIDE SERPL-SCNC: 108 MMOL/L (ref 98–112)
CHOLEST SERPL-MCNC: 210 MG/DL (ref ?–200)
CO2 SERPL-SCNC: 23 MMOL/L (ref 21–32)
CREAT BLD-MCNC: 1.05 MG/DL
EOSINOPHIL # BLD AUTO: 0.08 X10(3) UL (ref 0–0.7)
EOSINOPHIL NFR BLD AUTO: 1.4 %
ERYTHROCYTE [DISTWIDTH] IN BLOOD BY AUTOMATED COUNT: 14.2 %
EST. AVERAGE GLUCOSE BLD GHB EST-MCNC: 140 MG/DL (ref 68–126)
FASTING PATIENT LIPID ANSWER: YES
FASTING STATUS PATIENT QL REPORTED: YES
GFR SERPLBLD BASED ON 1.73 SQ M-ARVRAT: 73 ML/MIN/1.73M2 (ref 60–?)
GLOBULIN PLAS-MCNC: 2.9 G/DL (ref 2.8–4.4)
GLUCOSE BLD-MCNC: 117 MG/DL (ref 70–99)
HBA1C MFR BLD: 6.5 % (ref ?–5.7)
HCT VFR BLD AUTO: 35.3 %
HDLC SERPL-MCNC: 42 MG/DL (ref 40–59)
HGB BLD-MCNC: 11.6 G/DL
IMM GRANULOCYTES # BLD AUTO: 0.02 X10(3) UL (ref 0–1)
IMM GRANULOCYTES NFR BLD: 0.4 %
LDLC SERPL CALC-MCNC: 132 MG/DL (ref ?–100)
LYMPHOCYTES # BLD AUTO: 1.8 X10(3) UL (ref 1–4)
LYMPHOCYTES NFR BLD AUTO: 32 %
MCH RBC QN AUTO: 29.5 PG (ref 26–34)
MCHC RBC AUTO-ENTMCNC: 32.9 G/DL (ref 31–37)
MCV RBC AUTO: 89.8 FL
MONOCYTES # BLD AUTO: 0.43 X10(3) UL (ref 0.1–1)
MONOCYTES NFR BLD AUTO: 7.7 %
NEUTROPHILS # BLD AUTO: 3.25 X10 (3) UL (ref 1.5–7.7)
NEUTROPHILS # BLD AUTO: 3.25 X10(3) UL (ref 1.5–7.7)
NEUTROPHILS NFR BLD AUTO: 57.8 %
NONHDLC SERPL-MCNC: 168 MG/DL (ref ?–130)
OSMOLALITY SERPL CALC.SUM OF ELEC: 291 MOSM/KG (ref 275–295)
PLATELET # BLD AUTO: 209 10(3)UL (ref 150–450)
POTASSIUM SERPL-SCNC: 5.2 MMOL/L (ref 3.5–5.1)
PROT SERPL-MCNC: 6.7 G/DL (ref 6.4–8.2)
RBC # BLD AUTO: 3.93 X10(6)UL
SODIUM SERPL-SCNC: 135 MMOL/L (ref 136–145)
TRIGL SERPL-MCNC: 201 MG/DL (ref 30–149)
VLDLC SERPL CALC-MCNC: 37 MG/DL (ref 0–30)
WBC # BLD AUTO: 5.6 X10(3) UL (ref 4–11)

## 2023-05-30 PROCEDURE — 83036 HEMOGLOBIN GLYCOSYLATED A1C: CPT

## 2023-05-30 PROCEDURE — 80061 LIPID PANEL: CPT

## 2023-05-30 PROCEDURE — 36415 COLL VENOUS BLD VENIPUNCTURE: CPT

## 2023-05-30 PROCEDURE — 80053 COMPREHEN METABOLIC PANEL: CPT

## 2023-05-30 PROCEDURE — 85025 COMPLETE CBC W/AUTO DIFF WBC: CPT

## 2023-06-20 RX ORDER — LISINOPRIL 10 MG/1
TABLET ORAL
Qty: 90 TABLET | Refills: 0 | Status: SHIPPED | OUTPATIENT
Start: 2023-06-20

## 2023-08-01 RX ORDER — ROSUVASTATIN CALCIUM 20 MG/1
20 TABLET, COATED ORAL NIGHTLY
Qty: 90 TABLET | Refills: 1 | Status: SHIPPED | OUTPATIENT
Start: 2023-08-01

## 2023-08-01 NOTE — TELEPHONE ENCOUNTER
Relative calling, requesting refill on ROSUVASTATIN 20 MG Oral Tab   Needs new prescription sent due to it being more than a year ago.  Will like to use [facility], please advise

## 2023-09-11 RX ORDER — LISINOPRIL 10 MG/1
10 TABLET ORAL DAILY
Qty: 90 TABLET | Refills: 0 | Status: SHIPPED | OUTPATIENT
Start: 2023-09-11

## 2023-09-11 NOTE — TELEPHONE ENCOUNTER
Name from pharmacy: Lisinopril 10 Mg Tab [facility]         Will file in chart as: LISINOPRIL 10 MG Oral Tab    Sig: TAKE ONE TABLET BY MOUTH ONE TIME DAILY    Disp: 90 tablet    Refills: 0    Start: 9/11/2023    Class: Normal    Non-formulary    Last ordered: 2 months ago by Hira Noonan MD Last refill: 6/20/2023    Rx #: [id number]    Hypertension Medications Protocol [id number]/11/2023 03:51 PM   Protocol Details Appointment in past 6 or next 3 months    CMP or BMP in past 12 months    Last serum creatinine< 2.0      To be filled at: [address] [place] [address], [phone]     No future appointments.     LOV: cpx: 10/26/22  Last r/f: 6/20/23 # 90 0 r/fs  Labs: 5/30/23     RTC: 6 months    Please advise

## 2023-09-13 RX ORDER — LISINOPRIL 10 MG/1
10 TABLET ORAL DAILY
Qty: 90 TABLET | Refills: 0 | Status: SHIPPED | OUTPATIENT
Start: 2023-09-13

## 2023-10-10 ENCOUNTER — LAB ENCOUNTER (OUTPATIENT)
Dept: LAB | Age: 78
End: 2023-10-10
Attending: INTERNAL MEDICINE
Payer: MEDICARE

## 2023-10-10 DIAGNOSIS — I10 ESSENTIAL HYPERTENSION, MALIGNANT: ICD-10-CM

## 2023-10-10 DIAGNOSIS — E78.5 HYPERLIPIDEMIA: Primary | ICD-10-CM

## 2023-10-10 LAB
ALBUMIN SERPL-MCNC: 3.6 G/DL (ref 3.4–5)
ALBUMIN/GLOB SERPL: 1.2 {RATIO} (ref 1–2)
ALP LIVER SERPL-CCNC: 65 U/L
ALT SERPL-CCNC: 50 U/L
ANION GAP SERPL CALC-SCNC: 8 MMOL/L (ref 0–18)
AST SERPL-CCNC: 20 U/L (ref 15–37)
BILIRUB SERPL-MCNC: 0.3 MG/DL (ref 0.1–2)
BUN BLD-MCNC: 55 MG/DL (ref 7–18)
CALCIUM BLD-MCNC: 8.6 MG/DL (ref 8.5–10.1)
CHLORIDE SERPL-SCNC: 112 MMOL/L (ref 98–112)
CHOLEST SERPL-MCNC: 98 MG/DL (ref ?–200)
CO2 SERPL-SCNC: 20 MMOL/L (ref 21–32)
CREAT BLD-MCNC: 1 MG/DL
EGFRCR SERPLBLD CKD-EPI 2021: 78 ML/MIN/1.73M2 (ref 60–?)
FASTING PATIENT LIPID ANSWER: YES
FASTING STATUS PATIENT QL REPORTED: YES
GLOBULIN PLAS-MCNC: 3.1 G/DL (ref 2.8–4.4)
GLUCOSE BLD-MCNC: 126 MG/DL (ref 70–99)
HDLC SERPL-MCNC: 40 MG/DL (ref 40–59)
LDLC SERPL CALC-MCNC: 41 MG/DL (ref ?–100)
NONHDLC SERPL-MCNC: 58 MG/DL (ref ?–130)
OSMOLALITY SERPL CALC.SUM OF ELEC: 307 MOSM/KG (ref 275–295)
POTASSIUM SERPL-SCNC: 4.9 MMOL/L (ref 3.5–5.1)
PROT SERPL-MCNC: 6.7 G/DL (ref 6.4–8.2)
SODIUM SERPL-SCNC: 140 MMOL/L (ref 136–145)
TRIGL SERPL-MCNC: 87 MG/DL (ref 30–149)
VLDLC SERPL CALC-MCNC: 12 MG/DL (ref 0–30)

## 2023-10-10 PROCEDURE — 80053 COMPREHEN METABOLIC PANEL: CPT

## 2023-10-10 PROCEDURE — 80061 LIPID PANEL: CPT

## 2023-10-10 PROCEDURE — 36415 COLL VENOUS BLD VENIPUNCTURE: CPT

## 2023-11-20 ENCOUNTER — OFFICE VISIT (OUTPATIENT)
Dept: FAMILY MEDICINE CLINIC | Facility: CLINIC | Age: 78
End: 2023-11-20
Payer: MEDICARE

## 2023-11-20 VITALS
OXYGEN SATURATION: 99 % | HEART RATE: 52 BPM | TEMPERATURE: 97 F | SYSTOLIC BLOOD PRESSURE: 122 MMHG | RESPIRATION RATE: 14 BRPM | BODY MASS INDEX: 23 KG/M2 | DIASTOLIC BLOOD PRESSURE: 72 MMHG | HEIGHT: 68 IN

## 2023-11-20 DIAGNOSIS — E11.9 DIABETES MELLITUS TYPE 2 IN NONOBESE (HCC): Primary | ICD-10-CM

## 2023-11-20 DIAGNOSIS — Z00.00 ROUTINE GENERAL MEDICAL EXAMINATION AT A HEALTH CARE FACILITY: ICD-10-CM

## 2023-11-20 DIAGNOSIS — Z00.00 ENCOUNTER FOR ANNUAL HEALTH EXAMINATION: ICD-10-CM

## 2023-11-20 RX ORDER — EZETIMIBE 10 MG/1
10 TABLET ORAL DAILY
COMMUNITY
End: 2023-11-20

## 2024-01-21 RX ORDER — ROSUVASTATIN CALCIUM 20 MG/1
20 TABLET, COATED ORAL NIGHTLY
Qty: 90 TABLET | Refills: 0 | Status: SHIPPED | OUTPATIENT
Start: 2024-01-21

## 2024-03-21 NOTE — TELEPHONE ENCOUNTER
Pt called and said she would like a refill for lisinopril 10 MG Oral Tab sent to the [facility] in [place]  listed in her chart.

## 2024-03-24 RX ORDER — LISINOPRIL 10 MG/1
10 TABLET ORAL DAILY
Qty: 90 TABLET | Refills: 0 | Status: SHIPPED | OUTPATIENT
Start: 2024-03-24

## 2024-06-17 RX ORDER — LISINOPRIL 10 MG/1
10 TABLET ORAL DAILY
Qty: 90 TABLET | Refills: 0 | Status: SHIPPED | OUTPATIENT
Start: 2024-06-17

## 2024-07-30 ENCOUNTER — LAB ENCOUNTER (OUTPATIENT)
Dept: LAB | Age: 79
End: 2024-07-30
Attending: FAMILY MEDICINE
Payer: MEDICARE

## 2024-07-30 ENCOUNTER — VIRTUAL PHONE E/M (OUTPATIENT)
Dept: FAMILY MEDICINE CLINIC | Facility: CLINIC | Age: 79
End: 2024-07-30
Payer: MEDICARE

## 2024-07-30 DIAGNOSIS — E78.5 DYSLIPIDEMIA: Primary | ICD-10-CM

## 2024-07-30 DIAGNOSIS — Z00.00 ROUTINE GENERAL MEDICAL EXAMINATION AT A HEALTH CARE FACILITY: ICD-10-CM

## 2024-07-30 DIAGNOSIS — I10 HYPERTENSION: ICD-10-CM

## 2024-07-30 DIAGNOSIS — E11.9 DIABETES MELLITUS TYPE 2 IN NONOBESE (HCC): ICD-10-CM

## 2024-07-30 DIAGNOSIS — E11.9 DIABETES MELLITUS TYPE 2 IN NONOBESE (HCC): Primary | ICD-10-CM

## 2024-07-30 LAB
ALBUMIN SERPL-MCNC: 4.3 G/DL (ref 3.2–4.8)
ALBUMIN/GLOB SERPL: 2.2 {RATIO} (ref 1–2)
ALP LIVER SERPL-CCNC: 51 U/L
ALT SERPL-CCNC: 49 U/L
ANION GAP SERPL CALC-SCNC: 4 MMOL/L (ref 0–18)
AST SERPL-CCNC: 36 U/L (ref ?–34)
BILIRUB SERPL-MCNC: 0.3 MG/DL (ref 0.2–1.1)
BUN BLD-MCNC: 34 MG/DL (ref 9–23)
CALCIUM BLD-MCNC: 9.5 MG/DL (ref 8.7–10.4)
CHLORIDE SERPL-SCNC: 110 MMOL/L (ref 98–112)
CHOLEST SERPL-MCNC: 104 MG/DL (ref ?–200)
CO2 SERPL-SCNC: 27 MMOL/L (ref 21–32)
CREAT BLD-MCNC: 0.97 MG/DL
CREAT UR-SCNC: 78.9 MG/DL
EGFRCR SERPLBLD CKD-EPI 2021: 80 ML/MIN/1.73M2 (ref 60–?)
EST. AVERAGE GLUCOSE BLD GHB EST-MCNC: 143 MG/DL (ref 68–126)
FASTING PATIENT LIPID ANSWER: YES
FASTING STATUS PATIENT QL REPORTED: YES
GLOBULIN PLAS-MCNC: 2 G/DL (ref 2–3.5)
GLUCOSE BLD-MCNC: 130 MG/DL (ref 70–99)
HBA1C MFR BLD: 6.6 % (ref ?–5.7)
HDLC SERPL-MCNC: 39 MG/DL (ref 40–59)
LDLC SERPL CALC-MCNC: 48 MG/DL (ref ?–100)
MICROALBUMIN UR-MCNC: 0.7 MG/DL
MICROALBUMIN/CREAT 24H UR-RTO: 8.9 UG/MG (ref ?–30)
NONHDLC SERPL-MCNC: 65 MG/DL (ref ?–130)
OSMOLALITY SERPL CALC.SUM OF ELEC: 301 MOSM/KG (ref 275–295)
POTASSIUM SERPL-SCNC: 5.3 MMOL/L (ref 3.5–5.1)
PROT SERPL-MCNC: 6.3 G/DL (ref 5.7–8.2)
SODIUM SERPL-SCNC: 141 MMOL/L (ref 136–145)
TRIGL SERPL-MCNC: 88 MG/DL (ref 30–149)
VLDLC SERPL CALC-MCNC: 12 MG/DL (ref 0–30)

## 2024-07-30 PROCEDURE — 36415 COLL VENOUS BLD VENIPUNCTURE: CPT

## 2024-07-30 PROCEDURE — 80061 LIPID PANEL: CPT

## 2024-07-30 PROCEDURE — 82570 ASSAY OF URINE CREATININE: CPT

## 2024-07-30 PROCEDURE — 82043 UR ALBUMIN QUANTITATIVE: CPT

## 2024-07-30 PROCEDURE — 83036 HEMOGLOBIN GLYCOSYLATED A1C: CPT

## 2024-07-30 PROCEDURE — 84154 ASSAY OF PSA FREE: CPT

## 2024-07-30 PROCEDURE — 80053 COMPREHEN METABOLIC PANEL: CPT

## 2024-07-30 PROCEDURE — 84153 ASSAY OF PSA TOTAL: CPT

## 2024-07-30 PROCEDURE — 99442 PHONE E/M BY PHYS 11-20 MIN: CPT | Performed by: FAMILY MEDICINE

## 2024-07-30 NOTE — PROGRESS NOTES
Subjective:   Patient ID: Rajinder Adam is a 78 year old male.    HPI  Mr. Pemberton is a pleasant 78-year-old gentleman with history of diabetes mellitus which is diet-controlled, hypertension, hyperlipidemia, hypogonadism, coronary disease presenting for phone visit today for follow-up for laboratory test that he did today.  He manages his diabetes with diet.  It has been under good control.  A1c 6.6%.  He is wheelchair-bound and would need the help of his brother to go to certain places.  By fall he goes to [place] and spends his time for about 6 months.  Otherwise he did not have any concerns or complaints. I had reviewed past medical and family histories together with allergy and medication lists documented.    History/Other:   Review of Systems   Constitutional:  Negative for fatigue and fever.   Respiratory:  Negative for cough and shortness of breath.    Cardiovascular:  Negative for chest pain.   Gastrointestinal:  Negative for abdominal pain.     Current Outpatient Medications   Medication Sig Dispense Refill    LISINOPRIL 10 MG Oral Tab Take 1 tablet (10 mg total) by mouth daily. 90 tablet 0    rosuvastatin 20 MG Oral Tab Take 1 tablet (20 mg total) by mouth nightly. 90 tablet 0    Testosterone (ANDRODERM) 4 MG/24HR Transdermal Patch 24 Hr Place 1 patch onto the skin See Admin Instructions. 5 days a week and apply 2 patches on Saturday and Sunday 30 patch 0    OneTouch Delica Lancets Fine Does not apply Misc Dm 50 each 6    aspirin 81 MG Oral Tab EC Take 1 tablet (81 mg total) by mouth daily. (Patient taking differently: Take 1 tablet (81 mg total) by mouth every other day.) 30 tablet 3    spironolactone 25 MG Oral Tab Take 0.5 tablets (12.5 mg total) by mouth daily. 30 tablet 3    Prasugrel HCl 10 MG Oral Tab Take 1 tablet (10 mg total) by mouth daily. 30 tablet 5    Multiple Vitamin (DAILY VITAMINS) Oral Tab Take 1 tablet by mouth daily.       Allergies:  Allergies   Allergen Reactions    Clindamycin  RASH    Neomycin-Polymyxin B RASH and OTHER (SEE COMMENTS)       Objective:   Physical Exam  Constitutional:       General: He is not in acute distress.  Neck:      Vascular: Carotid bruit: .   Neurological:      Mental Status: He is alert.   Psychiatric:         Mood and Affect: Mood normal.         Behavior: Behavior normal.         Assessment & Plan:   1. Diabetes mellitus type 2 in nonobese (HCC)    -Well-controlled  - Continue with current dietary regimen  - Will continue to monitor.  - He will try to set up an wellness exam with me before he leaves for [place] in October.    No orders of the defined types were placed in this encounter.      Meds This Visit:  Requested Prescriptions      No prescriptions requested or ordered in this encounter       Imaging & Referrals:  None

## 2024-07-31 LAB — PROSTATE SPECIFIC AG: <0.1 NG/ML

## 2024-09-16 RX ORDER — LISINOPRIL 10 MG/1
10 TABLET ORAL DAILY
Qty: 90 TABLET | Refills: 0 | Status: SHIPPED | OUTPATIENT
Start: 2024-09-16

## 2024-11-12 ENCOUNTER — TELEPHONE (OUTPATIENT)
Dept: FAMILY MEDICINE CLINIC | Facility: CLINIC | Age: 79
End: 2024-11-12

## 2024-11-12 NOTE — TELEPHONE ENCOUNTER
Patient returned call, patient currently in [place]. Patient due to return in May, informed patient to give office a call when returns.

## 2025-01-03 ENCOUNTER — APPOINTMENT (OUTPATIENT)
Dept: CT IMAGING | Age: 80
End: 2025-01-03
Attending: EMERGENCY MEDICINE
Payer: MEDICARE

## 2025-01-03 ENCOUNTER — HOSPITAL ENCOUNTER (EMERGENCY)
Age: 80
Discharge: HOME | End: 2025-01-03
Attending: EMERGENCY MEDICINE
Payer: MEDICARE

## 2025-01-03 ENCOUNTER — APPOINTMENT (OUTPATIENT)
Dept: GENERAL RADIOLOGY | Age: 80
End: 2025-01-03
Attending: EMERGENCY MEDICINE
Payer: MEDICARE

## 2025-01-03 VITALS
HEART RATE: 60 BPM | BODY MASS INDEX: 22.43 KG/M2 | DIASTOLIC BLOOD PRESSURE: 49 MMHG | HEIGHT: 68 IN | RESPIRATION RATE: 16 BRPM | SYSTOLIC BLOOD PRESSURE: 141 MMHG | WEIGHT: 148 LBS | OXYGEN SATURATION: 100 %

## 2025-01-03 DIAGNOSIS — G56.22 ULNAR NEUROPATHY OF LEFT UPPER EXTREMITY: ICD-10-CM

## 2025-01-03 DIAGNOSIS — E87.5 HYPERKALEMIA: Primary | ICD-10-CM

## 2025-01-03 LAB
ALBUMIN SERPL-MCNC: 4.9 G/DL (ref 3.2–4.8)
ALBUMIN/GLOB SERPL: 1.8 {RATIO} (ref 1–2)
ALP LIVER SERPL-CCNC: 63 U/L
ALT SERPL-CCNC: 20 U/L
ANION GAP SERPL CALC-SCNC: 4 MMOL/L (ref 0–18)
ANION GAP SERPL CALC-SCNC: 6 MMOL/L (ref 0–18)
AST SERPL-CCNC: 20 U/L (ref ?–34)
ATRIAL RATE: 60 BPM
BASOPHILS # BLD AUTO: 0.02 X10(3) UL (ref 0–0.2)
BASOPHILS NFR BLD AUTO: 0.2 %
BILIRUB SERPL-MCNC: 0.3 MG/DL (ref 0.2–1.1)
BILIRUB UR QL STRIP.AUTO: NEGATIVE
BUN BLD-MCNC: 42 MG/DL (ref 9–23)
BUN BLD-MCNC: 42 MG/DL (ref 9–23)
CALCIUM BLD-MCNC: 10 MG/DL (ref 8.7–10.4)
CALCIUM BLD-MCNC: 9 MG/DL (ref 8.7–10.4)
CHLORIDE SERPL-SCNC: 110 MMOL/L (ref 98–112)
CHLORIDE SERPL-SCNC: 114 MMOL/L (ref 98–112)
CLARITY UR REFRACT.AUTO: CLEAR
CO2 SERPL-SCNC: 21 MMOL/L (ref 21–32)
CO2 SERPL-SCNC: 22 MMOL/L (ref 21–32)
COLOR UR AUTO: YELLOW
CREAT BLD-MCNC: 1.08 MG/DL
CREAT BLD-MCNC: 1.15 MG/DL
EGFRCR SERPLBLD CKD-EPI 2021: 65 ML/MIN/1.73M2 (ref 60–?)
EGFRCR SERPLBLD CKD-EPI 2021: 70 ML/MIN/1.73M2 (ref 60–?)
EOSINOPHIL # BLD AUTO: 0.03 X10(3) UL (ref 0–0.7)
EOSINOPHIL NFR BLD AUTO: 0.3 %
ERYTHROCYTE [DISTWIDTH] IN BLOOD BY AUTOMATED COUNT: 13.6 %
GLOBULIN PLAS-MCNC: 2.8 G/DL (ref 2–3.5)
GLUCOSE BLD-MCNC: 115 MG/DL (ref 70–99)
GLUCOSE BLD-MCNC: 134 MG/DL (ref 70–99)
GLUCOSE UR STRIP.AUTO-MCNC: NEGATIVE MG/DL
HCT VFR BLD AUTO: 37.9 %
HGB BLD-MCNC: 12.8 G/DL
IMM GRANULOCYTES # BLD AUTO: 0.03 X10(3) UL (ref 0–1)
IMM GRANULOCYTES NFR BLD: 0.3 %
LEUKOCYTE ESTERASE UR QL STRIP.AUTO: NEGATIVE
LYMPHOCYTES # BLD AUTO: 1.37 X10(3) UL (ref 1–4)
LYMPHOCYTES NFR BLD AUTO: 13.5 %
MCH RBC QN AUTO: 30.2 PG (ref 26–34)
MCHC RBC AUTO-ENTMCNC: 33.8 G/DL (ref 31–37)
MCV RBC AUTO: 89.4 FL
MONOCYTES # BLD AUTO: 0.75 X10(3) UL (ref 0.1–1)
MONOCYTES NFR BLD AUTO: 7.4 %
NEUTROPHILS # BLD AUTO: 7.94 X10 (3) UL (ref 1.5–7.7)
NEUTROPHILS # BLD AUTO: 7.94 X10(3) UL (ref 1.5–7.7)
NEUTROPHILS NFR BLD AUTO: 78.3 %
NITRITE UR QL STRIP.AUTO: NEGATIVE
OSMOLALITY SERPL CALC.SUM OF ELEC: 296 MOSM/KG (ref 275–295)
OSMOLALITY SERPL CALC.SUM OF ELEC: 301 MOSM/KG (ref 275–295)
P AXIS: 59 DEGREES
P-R INTERVAL: 110 MS
PH UR STRIP.AUTO: 5 [PH] (ref 5–8)
PLATELET # BLD AUTO: 245 10(3)UL (ref 150–450)
POTASSIUM SERPL-SCNC: 5.7 MMOL/L (ref 3.5–5.1)
POTASSIUM SERPL-SCNC: 6 MMOL/L (ref 3.5–5.1)
PROT SERPL-MCNC: 7.7 G/DL (ref 5.7–8.2)
PROT UR STRIP.AUTO-MCNC: NEGATIVE MG/DL
Q-T INTERVAL: 412 MS
QRS DURATION: 66 MS
QTC CALCULATION (BEZET): 412 MS
R AXIS: 20 DEGREES
RBC # BLD AUTO: 4.24 X10(6)UL
RBC UR QL AUTO: NEGATIVE
SODIUM SERPL-SCNC: 137 MMOL/L (ref 136–145)
SODIUM SERPL-SCNC: 140 MMOL/L (ref 136–145)
SP GR UR STRIP.AUTO: 1.02 (ref 1–1.03)
T AXIS: 69 DEGREES
TROPONIN I SERPL HS-MCNC: 5 NG/L
UROBILINOGEN UR STRIP.AUTO-MCNC: 0.2 MG/DL
VENTRICULAR RATE: 60 BPM
WBC # BLD AUTO: 10.1 X10(3) UL (ref 4–11)

## 2025-01-03 PROCEDURE — 93005 ELECTROCARDIOGRAM TRACING: CPT

## 2025-01-03 PROCEDURE — 81003 URINALYSIS AUTO W/O SCOPE: CPT | Performed by: EMERGENCY MEDICINE

## 2025-01-03 PROCEDURE — 72125 CT NECK SPINE W/O DYE: CPT | Performed by: EMERGENCY MEDICINE

## 2025-01-03 PROCEDURE — 84484 ASSAY OF TROPONIN QUANT: CPT | Performed by: EMERGENCY MEDICINE

## 2025-01-03 PROCEDURE — 71045 X-RAY EXAM CHEST 1 VIEW: CPT | Performed by: EMERGENCY MEDICINE

## 2025-01-03 PROCEDURE — 80053 COMPREHEN METABOLIC PANEL: CPT | Performed by: EMERGENCY MEDICINE

## 2025-01-03 PROCEDURE — 96374 THER/PROPH/DIAG INJ IV PUSH: CPT

## 2025-01-03 PROCEDURE — 85025 COMPLETE CBC W/AUTO DIFF WBC: CPT | Performed by: EMERGENCY MEDICINE

## 2025-01-03 PROCEDURE — 99285 EMERGENCY DEPT VISIT HI MDM: CPT

## 2025-01-03 PROCEDURE — 96361 HYDRATE IV INFUSION ADD-ON: CPT

## 2025-01-03 PROCEDURE — 93010 ELECTROCARDIOGRAM REPORT: CPT

## 2025-01-03 RX ORDER — HYDROMORPHONE HYDROCHLORIDE 1 MG/ML
0.5 INJECTION, SOLUTION INTRAMUSCULAR; INTRAVENOUS; SUBCUTANEOUS EVERY 30 MIN PRN
Status: DISCONTINUED | OUTPATIENT
Start: 2025-01-03 | End: 2025-01-03

## 2025-01-03 RX ORDER — KETOROLAC TROMETHAMINE 15 MG/ML
15 INJECTION, SOLUTION INTRAMUSCULAR; INTRAVENOUS ONCE
Status: COMPLETED | OUTPATIENT
Start: 2025-01-03 | End: 2025-01-03

## 2025-01-03 NOTE — ED PROVIDER NOTES
Patient Seen in: [facility] In [place]      History     Chief Complaint   Patient presents with    Chest Pain Angina     Stated Complaint: Pt c/o left sided chest pain; left arm pain, numbness in left 4th, 5th digit    Subjective:   HPI      79-year-old male presents for evaluation of left arm pain.  Patient has had left arm pain for quite some time but seems to be worse in the last 24 hours.  Pain radiates from the back of his left shoulder down into his hand.  He has numbness to his left fourth and fifth digit that also has been present for quite some time.  He is unable to grasp.  This also has been present for quite some time.  Denies chest pain or shortness of breath to me.  Denies headache or significant neck pain.    Objective:     Past Medical History:    High blood pressure    High cholesterol    Hypogonadism in male    Neuropathy    Osteoarthritis    Osteoarthritis of left knee    Other and unspecified hyperlipidemia              Past Surgical History:   Procedure Laterality Date    Cataract      Eye surgery      Other surgical history      RCR left                 Social History     Socioeconomic History    Marital status:    Tobacco Use    Smoking status: Former    Smokeless tobacco: Never    Tobacco comments:     quit many years ago   Vaping Use    Vaping status: Never Used   Substance and Sexual Activity    Alcohol use: Yes     Comment: occasionly     Drug use: No   Other Topics Concern    Caffeine Concern No     Comment: 1 cup of coffee in the morning    Exercise No     Comment: not consistent     Social Drivers of Health      [facility]    Housing Stability                  Physical Exam     ED Triage Vitals [01/03/25 1525]   /61   Pulse 60   Resp 19   Temp    Temp src    SpO2 100 %   O2 Device None (Room air)       Current Vitals:   Vital Signs  BP: 126/84  Pulse: 65  Resp: (!) 28    Oxygen  Therapy  SpO2: 100 %  O2 Device: None (Room air)        Physical Exam     General: Alert, oriented, no apparent distress  HEENT:  Pupils equal reactive.  Extraocular motions intact. Dry MM  Neck: Supple  Lungs: Clear to auscultation bilaterally.  Heart: Regular rate and rhythm.  Strong radial pulses  Abdomen: Soft, nontender.   Skin: No rash.  No edema.  Neurologic: No focal neurologic deficits except decreased sensation to the left ring and little finger as well as inability to extend those digits..  Normal speech pattern  Musculoskeletal: No tenderness or deformity noted.  Full range of motion throughout.      ED Course     Labs Reviewed   COMP METABOLIC PANEL (14) - Abnormal; Notable for the following components:       Result Value    Glucose 134 (*)     Potassium 5.7 (*)     BUN 42 (*)     Calculated Osmolality 296 (*)     Albumin 4.9 (*)     All other components within normal limits   CBC WITH DIFFERENTIAL WITH PLATELET - Abnormal; Notable for the following components:    HGB 12.8 (*)     HCT 37.9 (*)     Neutrophil Absolute Prelim 7.94 (*)     Neutrophil Absolute 7.94 (*)     All other components within normal limits   URINALYSIS WITH CULTURE REFLEX - Abnormal; Notable for the following components:    Ketones Urine Trace (*)     All other components within normal limits   BASIC METABOLIC PANEL (8) - Abnormal; Notable for the following components:    Glucose 115 (*)     Potassium 6.0 (*)     Chloride 114 (*)     BUN 42 (*)     Calculated Osmolality 301 (*)     All other components within normal limits   TROPONIN I HIGH SENSITIVITY - Normal   RAINBOW DRAW BLUE     EKG    Rate, intervals and axes as noted on EKG Report.  Rate: 60  Rhythm: Sinus Rhythm  Reading: no ST elevation or depression                       MDM      Differential diagnosis includes ACS, cervical radiculopathy, metabolic disturbance, CVA    Patient clearly has a left upper extremity ulnar neuropathy that probably has been going on for quite  some time because his left fourth and fifth digits are clawed    I have independently visualized the radiology images, my focus/limited interpretation: No focal consolidation or pulmonary edema    Defer to radiologist for other/incidental findings    CT SPINE CERVICAL (CPT=72125)    Result Date: 1/3/2025  PROCEDURE:  CT SPINE CERVICAL (CPT=72125)  COMPARISON:  None.  INDICATIONS:  Pt c/o left sided chest pain; left arm pain, numbness in left 4th, 5th digit, neck pain  TECHNIQUE:  Noncontrast CT scanning of the cervical spine is performed from the skull base through C7.  Multiplanar reconstructions are generated.  Dose reduction techniques were used. Dose information is transmitted to the ACR (American College of Radiology) NRDR (National Radiology Data Registry) which includes the Dose Index Registry.  PATIENT STATED HISTORY: (As transcribed by Technologist)    Pt c/o left sided chest pain; left arm pain, numbness in left 4th, 5th digit, no inj   FINDINGS:  No acute fracture or subluxation in the cervical spine.  Mild rightward curvature.  Straightening of the normal cervical lordosis with relatively anatomic alignment.  Vertebral body heights are well-maintained.  Moderate degenerative disc space loss and endplate spurring most pronounced at C5-6 and C6-7.  Scattered small uncovertebral osteophytes noted.  Scattered mild to moderate facet arthropathy in the cervical spine.  C1-2 articulation intact.  At least mild spinal canal stenosis at C3-4, C4-5, C5-6, and C6-7.  Mild to moderate left neural foraminal stenosis at C2-3.  Mild bilateral neural foraminal stenosis at C3-4.  Mild to moderate right neural foraminal stenosis at C4-5.  Moderate right and  mild left neural foraminal stenosis at C5-6.  Moderate bilateral neural foraminal stenosis at C6-7.  Minimal biapical pleural parenchymal scarring in the partially imaged lung apices.  Heterogenous thyroid gland.  Scattered vascular calcifications.  Paraspinal soft  tissues are otherwise grossly unremarkable.  Cerebellar atrophy noted in the partially imaged posterior fossa.             CONCLUSION:  No acute fracture or subluxation in the cervical spine.  Multilevel degenerative changes as above.  Please see above for further details.  LOCATION:  [facility]   Dictated by (CST): Morgan Law MD on 1/03/2025 at 5:38 PM     Finalized by (CST): Morgan Law MD on 1/03/2025 at 5:41 PM       XR CHEST AP PORTABLE  (CPT=71045)    Result Date: 1/3/2025  PROCEDURE:  XR CHEST AP PORTABLE  (CPT=71045)  TECHNIQUE:  AP chest radiograph was obtained.  COMPARISON:  [facility] , XR, XR CHEST AP PORTABLE  (CPT=71045), 7/09/2020, 4:14 PM.  INDICATIONS:  Pt c/o left sided chest pain; left arm pain, numbness in left 4th, 5th digit  PATIENT STATED HISTORY: (As transcribed by Technologist)  Sudden onset left side chest pain, with numbness down left arm.    FINDINGS:  Cardiomediastinal silhouette is within normal limits in size.  No focal consolidation, pleural effusion, or pneumothorax.  Marked degenerative change involves the shoulders bilaterally with probable rotator cuff pathology given high riding humeral heads, correlate clinically.            CONCLUSION:  No focal consolidation.   LOCATION:  [facility]      Dictated by (CST): Marissa Gutiérrez MD on 1/03/2025 at 3:53 PM     Finalized by (CST): Marissa Gutiérrez MD on 1/03/2025 at 3:54 PM          Chemistry with a slightly elevated potassium and BUN consistent with dehydration.  IV fluids given.  Troponin negative.  CBC normal    Suspect the patient has been over diuresed.  He is on spironolactone which I will hold.    Case discussed with Dr. Cervantes.  He recommends a dose of Veltassa or Lokelma, hold spironolactone and repeat BMP on Monday    Medical Decision Making  Amount and/or Complexity of Data Reviewed  Independent Historian: caregiver     Details: Brother arrives at bedside and states patient has been wheelchair-bound for several years.        Disposition  and Plan     Clinical Impression:  1. Hyperkalemia    2. Ulnar neuropathy of left upper extremity         Disposition:  Discharge  1/3/2025  6:46 pm    Follow-up:  [facility], [address]  [address] [address] [phone]  Call  choose option 1 for general neurology          Medications Prescribed:  Current Discharge Medication List              Supplementary Documentation:

## 2025-01-03 NOTE — ED INITIAL ASSESSMENT (HPI)
Pt reports left sided chest pain/left arm numbness/tingling, left hand finger tingling since yesterday at about 10am. Also reports BLE weakness. Pt reports he just returned on a flight from [place]. Pt reports he had a cardiac stent placed after a heart attack 3 years ago.

## 2025-01-04 NOTE — DISCHARGE INSTRUCTIONS
Neurologic Instructions    Call your doctor if you have:  increased pain or headache.   trouble seeing, walking or using your arms or legs.   dizziness or passing out.   any change in behavior (agitated or sleepy).   trouble being awakened from sleep.   numbness in your face, arm or leg.   extreme weakness.   trouble talking.   nausea and vomiting.   any new or severe symptoms.    Take your medicines as prescribed. Most important, see a doctor again as discussed. If you have problems that we have not discussed, call or visit your doctor right away. If you cannot reach your doctor, return to the Emergency Department.       STOP SPIRONOLACTONE!!!!    REPEAT LABS ON MONDAY HERE    INCREASE FLUIDS

## 2025-01-08 ENCOUNTER — LAB ENCOUNTER (OUTPATIENT)
Dept: LAB | Age: 80
End: 2025-01-08
Attending: EMERGENCY MEDICINE
Payer: MEDICARE

## 2025-01-08 DIAGNOSIS — E87.5 HYPERKALEMIA: ICD-10-CM

## 2025-01-08 LAB
ANION GAP SERPL CALC-SCNC: 6 MMOL/L (ref 0–18)
BUN BLD-MCNC: 35 MG/DL (ref 9–23)
CALCIUM BLD-MCNC: 9.7 MG/DL (ref 8.7–10.4)
CHLORIDE SERPL-SCNC: 110 MMOL/L (ref 98–112)
CO2 SERPL-SCNC: 25 MMOL/L (ref 21–32)
CREAT BLD-MCNC: 0.99 MG/DL
EGFRCR SERPLBLD CKD-EPI 2021: 77 ML/MIN/1.73M2 (ref 60–?)
GLUCOSE BLD-MCNC: 108 MG/DL (ref 70–99)
OSMOLALITY SERPL CALC.SUM OF ELEC: 301 MOSM/KG (ref 275–295)
POTASSIUM SERPL-SCNC: 5.2 MMOL/L (ref 3.5–5.1)
SODIUM SERPL-SCNC: 141 MMOL/L (ref 136–145)

## 2025-01-08 PROCEDURE — 80048 BASIC METABOLIC PNL TOTAL CA: CPT

## 2025-01-08 PROCEDURE — 36415 COLL VENOUS BLD VENIPUNCTURE: CPT

## 2025-03-03 ENCOUNTER — VIRTUAL PHONE E/M (OUTPATIENT)
Dept: FAMILY MEDICINE CLINIC | Facility: CLINIC | Age: 80
End: 2025-03-03
Payer: MEDICARE

## 2025-03-03 DIAGNOSIS — E29.1 HYPOGONADISM IN MALE: ICD-10-CM

## 2025-03-03 DIAGNOSIS — E87.5 HYPERKALEMIA: Primary | ICD-10-CM

## 2025-03-03 RX ORDER — LISINOPRIL 10 MG/1
10 TABLET ORAL DAILY
Qty: 90 TABLET | Refills: 0 | Status: SHIPPED | OUTPATIENT
Start: 2025-03-03

## 2025-03-04 NOTE — PROGRESS NOTES
Subjective:   Patient ID: Rajinder Adam is a 79 year old male.    HPI  Mr. Pemberton is a pleasant 78-year-old gentleman with history of diabetes mellitus which is diet-controlled, hypertension, hyperlipidemia, hypogonadism, coronary disease sp stent placement presenting for phone visit today for follow-up after he was seen at the emergency room on January 3, 2025.  He presented with left-sided chest pain associated with left arm numbness.  Laboratory test done showed potassium level of 6 which was rechecked which was down to 5.2.  He was advised to hold spironolactone which she has been doing.  He also was reading up on using testosterone and if it could increase the risk of heart attack.  Otherwise no fever no cough no chest pain no shortness of breath no nausea no vomiting no abdominal pain.    I had reviewed past medical and family histories together with allergy and medication lists documented.      History/Other:   Review of Systems  Constitutional:  Negative for fatigue and fever.   Respiratory:  Negative for cough and shortness of breath.    Cardiovascular:  Negative for chest pain.   Gastrointestinal:  Negative for abdominal pain.      Current Outpatient Medications   Medication Sig Dispense Refill    LISINOPRIL 10 MG Oral Tab Take 1 tablet (10 mg total) by mouth daily. 90 tablet 0    rosuvastatin 20 MG Oral Tab Take 1 tablet (20 mg total) by mouth nightly. 90 tablet 0    Testosterone (ANDRODERM) 4 MG/24HR Transdermal Patch 24 Hr Place 1 patch onto the skin See Admin Instructions. 5 days a week and apply 2 patches on Saturday and Sunday (Patient not taking: Reported on 1/3/2025) 30 patch 0    OneTouch Delica Lancets Fine Does not apply Misc Dm 50 each 6    aspirin 81 MG Oral Tab EC Take 1 tablet (81 mg total) by mouth daily. (Patient taking differently: Take 1 tablet (81 mg total) by mouth every other day.) 30 tablet 3    spironolactone 25 MG Oral Tab Take 0.5 tablets (12.5 mg total) by mouth daily. 30  tablet 3    Prasugrel HCl 10 MG Oral Tab Take 1 tablet (10 mg total) by mouth daily. 30 tablet 5    Multiple Vitamin (DAILY VITAMINS) Oral Tab Take 1 tablet by mouth daily.       Allergies:Allergies[1]    Objective:   Physical Exam  Constitutional:       General: He is not in acute distress.  Neurological:      Mental Status: He is alert.   Psychiatric:         Mood and Affect: Mood normal.         Behavior: Behavior normal.         Assessment & Plan:   1. Hyperkalemia   -Emergency room notes reviewed including laboratory test and diagnostic test done  - Agree with holding spironolactone  - Encouraged him to follow-up with me but would need help from his brother to take him to the office.  - Follow-up with cardiology who prescribes spironolactone  - Would need to recheck his potassium levels if able   2. Hypogonadism in male   -He may still use his Androderm patch if he wants to as he feels that he has more energy with this  - Doubt if this is why he had chest pain previously         This note was prepared using Dragon [employer] voice recognition dictation software. As a result errors may occur. When identified these errors have been corrected. While every attempt is made to correct errors during dictation discrepancies may still exist            No orders of the defined types were placed in this encounter.      Meds This Visit:  Requested Prescriptions      No prescriptions requested or ordered in this encounter       Imaging & Referrals:  None         [1]   Allergies  Allergen Reactions    Clindamycin RASH    Neomycin-Polymyxin B RASH and OTHER (SEE COMMENTS)

## 2025-06-01 ENCOUNTER — MED REC SCAN ONLY (OUTPATIENT)
Dept: FAMILY MEDICINE CLINIC | Facility: CLINIC | Age: 80
End: 2025-06-01

## 2025-06-02 RX ORDER — LISINOPRIL 10 MG/1
10 TABLET ORAL DAILY
Qty: 30 TABLET | Refills: 0 | Status: SHIPPED | OUTPATIENT
Start: 2025-06-02

## 2025-06-25 ENCOUNTER — LAB ENCOUNTER (OUTPATIENT)
Dept: LAB | Age: 80
End: 2025-06-25
Attending: FAMILY MEDICINE
Payer: MEDICARE

## 2025-06-25 ENCOUNTER — OFFICE VISIT (OUTPATIENT)
Dept: FAMILY MEDICINE CLINIC | Facility: CLINIC | Age: 80
End: 2025-06-25
Payer: MEDICARE

## 2025-06-25 ENCOUNTER — TELEPHONE (OUTPATIENT)
Dept: FAMILY MEDICINE CLINIC | Facility: CLINIC | Age: 80
End: 2025-06-25

## 2025-06-25 VITALS
OXYGEN SATURATION: 98 % | SYSTOLIC BLOOD PRESSURE: 108 MMHG | WEIGHT: 113 LBS | RESPIRATION RATE: 16 BRPM | BODY MASS INDEX: 17 KG/M2 | HEART RATE: 66 BPM | DIASTOLIC BLOOD PRESSURE: 68 MMHG | TEMPERATURE: 98 F

## 2025-06-25 DIAGNOSIS — G11.9 CEREBRAL ATAXIA (HCC): ICD-10-CM

## 2025-06-25 DIAGNOSIS — E11.9 CONTROLLED TYPE 2 DIABETES MELLITUS WITHOUT COMPLICATION, WITHOUT LONG-TERM CURRENT USE OF INSULIN (HCC): ICD-10-CM

## 2025-06-25 DIAGNOSIS — E29.1 HYPOGONADISM IN MALE: ICD-10-CM

## 2025-06-25 DIAGNOSIS — Z12.5 SCREENING FOR MALIGNANT NEOPLASM OF PROSTATE: ICD-10-CM

## 2025-06-25 DIAGNOSIS — Z00.00 ENCOUNTER FOR ANNUAL WELLNESS EXAM IN MEDICARE PATIENT: Primary | ICD-10-CM

## 2025-06-25 DIAGNOSIS — G31.84 MCI (MILD COGNITIVE IMPAIRMENT): ICD-10-CM

## 2025-06-25 DIAGNOSIS — I50.20 HEART FAILURE WITH REDUCED EJECTION FRACTION (HCC): ICD-10-CM

## 2025-06-25 PROBLEM — R79.89 AZOTEMIA: Status: RESOLVED | Noted: 2020-07-08 | Resolved: 2025-06-25

## 2025-06-25 PROBLEM — R73.9 HYPERGLYCEMIA: Status: RESOLVED | Noted: 2020-07-08 | Resolved: 2025-06-25

## 2025-06-25 PROBLEM — T14.8XXA SUPERFICIAL LACERATION: Status: RESOLVED | Noted: 2020-07-08 | Resolved: 2025-06-25

## 2025-06-25 PROBLEM — S89.92XA LEFT KNEE INJURY: Status: RESOLVED | Noted: 2017-07-24 | Resolved: 2025-06-25

## 2025-06-25 PROBLEM — I21.09: Status: RESOLVED | Noted: 2020-10-05 | Resolved: 2025-06-25

## 2025-06-25 PROBLEM — I21.4 NSTEMI (NON-ST ELEVATED MYOCARDIAL INFARCTION) (HCC): Status: RESOLVED | Noted: 2020-07-08 | Resolved: 2025-06-25

## 2025-06-25 PROBLEM — S00.03XA CONTUSION OF SCALP, INITIAL ENCOUNTER: Status: RESOLVED | Noted: 2020-07-08 | Resolved: 2025-06-25

## 2025-06-25 PROBLEM — R79.89 ELEVATED TROPONIN: Status: RESOLVED | Noted: 2020-07-08 | Resolved: 2025-06-25

## 2025-06-25 PROBLEM — I25.2 HISTORY OF NON-ST ELEVATION MYOCARDIAL INFARCTION (NSTEMI): Status: ACTIVE | Noted: 2025-06-25

## 2025-06-25 LAB
ALBUMIN SERPL-MCNC: 4.5 G/DL (ref 3.2–4.8)
ALBUMIN/GLOB SERPL: 1.8 {RATIO} (ref 1–2)
ALP LIVER SERPL-CCNC: 79 U/L (ref 45–117)
ALT SERPL-CCNC: 24 U/L (ref 10–49)
ANION GAP SERPL CALC-SCNC: 9 MMOL/L (ref 0–18)
AST SERPL-CCNC: 28 U/L (ref ?–34)
BASOPHILS # BLD AUTO: 0.03 X10(3) UL (ref 0–0.2)
BASOPHILS NFR BLD AUTO: 0.4 %
BILIRUB SERPL-MCNC: 0.2 MG/DL (ref 0.2–1.1)
BUN BLD-MCNC: 29 MG/DL (ref 9–23)
CALCIUM BLD-MCNC: 9.6 MG/DL (ref 8.7–10.6)
CHLORIDE SERPL-SCNC: 108 MMOL/L (ref 98–112)
CHOLEST SERPL-MCNC: 272 MG/DL (ref ?–200)
CO2 SERPL-SCNC: 25 MMOL/L (ref 21–32)
CREAT BLD-MCNC: 0.98 MG/DL (ref 0.7–1.3)
CREAT UR-SCNC: 62.1 MG/DL
EGFRCR SERPLBLD CKD-EPI 2021: 78 ML/MIN/1.73M2 (ref 60–?)
EOSINOPHIL # BLD AUTO: 0.1 X10(3) UL (ref 0–0.7)
EOSINOPHIL NFR BLD AUTO: 1.2 %
ERYTHROCYTE [DISTWIDTH] IN BLOOD BY AUTOMATED COUNT: 15.2 %
EST. AVERAGE GLUCOSE BLD GHB EST-MCNC: 148 MG/DL (ref 68–126)
FASTING PATIENT LIPID ANSWER: YES
FASTING STATUS PATIENT QL REPORTED: YES
GLOBULIN PLAS-MCNC: 2.5 G/DL (ref 2–3.5)
GLUCOSE BLD-MCNC: 117 MG/DL (ref 70–99)
HBA1C MFR BLD: 6.8 % (ref ?–5.7)
HCT VFR BLD AUTO: 36.2 % (ref 39–53)
HDLC SERPL-MCNC: 48 MG/DL (ref 40–59)
HGB BLD-MCNC: 11.8 G/DL (ref 13–17.5)
IMM GRANULOCYTES # BLD AUTO: 0.02 X10(3) UL (ref 0–1)
IMM GRANULOCYTES NFR BLD: 0.2 %
LDLC SERPL CALC-MCNC: 148 MG/DL (ref ?–100)
LYMPHOCYTES # BLD AUTO: 1.69 X10(3) UL (ref 1–4)
LYMPHOCYTES NFR BLD AUTO: 20.2 %
MCH RBC QN AUTO: 28.9 PG (ref 26–34)
MCHC RBC AUTO-ENTMCNC: 32.6 G/DL (ref 31–37)
MCV RBC AUTO: 88.7 FL (ref 80–100)
MICROALBUMIN UR-MCNC: <0.3 MG/DL
MONOCYTES # BLD AUTO: 0.55 X10(3) UL (ref 0.1–1)
MONOCYTES NFR BLD AUTO: 6.6 %
NEUTROPHILS # BLD AUTO: 5.97 X10 (3) UL (ref 1.5–7.7)
NEUTROPHILS # BLD AUTO: 5.97 X10(3) UL (ref 1.5–7.7)
NEUTROPHILS NFR BLD AUTO: 71.4 %
NONHDLC SERPL-MCNC: 224 MG/DL (ref ?–130)
OSMOLALITY SERPL CALC.SUM OF ELEC: 301 MOSM/KG (ref 275–295)
PLATELET # BLD AUTO: 222 10(3)UL (ref 150–450)
POTASSIUM SERPL-SCNC: 5.2 MMOL/L (ref 3.5–5.1)
PROT SERPL-MCNC: 7 G/DL (ref 5.7–8.2)
RBC # BLD AUTO: 4.08 X10(6)UL (ref 3.8–5.8)
SODIUM SERPL-SCNC: 142 MMOL/L (ref 136–145)
T4 FREE SERPL-MCNC: 1.3 NG/DL (ref 0.8–1.7)
TRIGL SERPL-MCNC: 411 MG/DL (ref 30–149)
TSI SER-ACNC: 1.75 UIU/ML (ref 0.55–4.78)
VLDLC SERPL CALC-MCNC: 81 MG/DL (ref 0–30)
WBC # BLD AUTO: 8.4 X10(3) UL (ref 4–11)

## 2025-06-25 PROCEDURE — 82570 ASSAY OF URINE CREATININE: CPT

## 2025-06-25 PROCEDURE — 84439 ASSAY OF FREE THYROXINE: CPT

## 2025-06-25 PROCEDURE — 80061 LIPID PANEL: CPT

## 2025-06-25 PROCEDURE — 83036 HEMOGLOBIN GLYCOSYLATED A1C: CPT

## 2025-06-25 PROCEDURE — 84443 ASSAY THYROID STIM HORMONE: CPT

## 2025-06-25 PROCEDURE — 82043 UR ALBUMIN QUANTITATIVE: CPT

## 2025-06-25 PROCEDURE — 36415 COLL VENOUS BLD VENIPUNCTURE: CPT

## 2025-06-25 PROCEDURE — 99214 OFFICE O/P EST MOD 30 MIN: CPT | Performed by: FAMILY MEDICINE

## 2025-06-25 PROCEDURE — G0439 PPPS, SUBSEQ VISIT: HCPCS | Performed by: FAMILY MEDICINE

## 2025-06-25 PROCEDURE — 80053 COMPREHEN METABOLIC PANEL: CPT

## 2025-06-25 PROCEDURE — 85025 COMPLETE CBC W/AUTO DIFF WBC: CPT

## 2025-06-25 RX ORDER — MEMANTINE HYDROCHLORIDE 5 MG/1
5 TABLET ORAL NIGHTLY
Qty: 90 TABLET | Refills: 1 | Status: SHIPPED | OUTPATIENT
Start: 2025-06-25

## 2025-06-25 RX ORDER — OFLOXACIN 3 MG/ML
1 SOLUTION/ DROPS OPHTHALMIC 4 TIMES DAILY
Qty: 10 ML | Refills: 0 | Status: SHIPPED | OUTPATIENT
Start: 2025-06-25

## 2025-06-25 RX ORDER — TESTOSTERONE 4 MG/D
1 PATCH TRANSDERMAL SEE ADMIN INSTRUCTIONS
Qty: 30 PATCH | Refills: 0 | Status: SHIPPED | OUTPATIENT
Start: 2025-06-25

## 2025-06-25 NOTE — PROGRESS NOTES
Subjective:   Rajinder Adam is a 79 year old male who presents for a Medicare Subsequent Annual Wellness visit (Pt already had Initial Annual Wellness) and scheduled follow up of multiple significant but stable problems.             Mr. Pemberton is a pleasant 79-year-old gentleman with history of diabetes mellitus which is diet-controlled, hypertension, hyperlipidemia, hypogonadism, coronary disease sp stent placement, status post non-ST elevation MI, cardiomyopathy, CHF, ataxia presenting today for his Medicare wellness exam.  He is accompanied by his niece and brother.  They were concerned that he has had moods in which he was be angry and challenged them.  He has a 24-hour caretaker with him here in [place].  When he was in [place] he had a caretaker but did not have that caretaker for 24 hours.  He was not well taking care of while he was there and would come back to [place].  He has had times where he had fallen in [place].  He also was somewhat frustrated that his Androderm patches which was prescribed previously by his physician has  and that he had still used it.  He has had weakness and has been struggling going and transferring from bed to chair.  He is on a wheelchair.    I had reviewed past medical and family histories together with allergy and medication lists documented.    History/Other:   Fall Risk Assessment:   He has been screened for Falls and is High Risk. Fall Prevention information provided to patient in After Visit Summary.    Do you feel unsteady when standing or walking?: Yes  Do you worry about falling?: Yes  Have you fallen in the past year?: Yes  How many times have you fallen?: 1  Were you injured?: Yes     Cognitive Assessment:   He had a completely normal cognitive assessment - see flowsheet entries       Functional Ability/Status:   Rajinder Adam has some abnormal functions as listed below:  He has Dressing and/or Bathing issues based on screening of functional  status.  Bathing or Showering: Cannot do without help  Dressing: Cannot do without help  He has Toileting difficulties based on screening of functional status.He has Eating difficulties based on screening of functional status.He has Driving difficulties based on screening of functional status. He has Meal Preparation difficulties based on screening of functional status.He has difficulties Managing Money/Bills based on screening of functional status.He has difficulties Shopping for Groceries based on screening of functional status. He has difficulties Taking Meds as Rx'd based on screening of functional status. He has Walking problems based on screening of functional status. He has problems with Daily Activities based on screening of functional status. He has problems with Memory based on screening of functional status.       Depression Screening (PHQ):  PHQ-9 TOTAL SCORE: 12  , done 6/25/2025   Little interest or pleasure in doing things: 3    Feeling down, depressed, or hopeless: 2    Trouble falling or staying asleep, or sleeping too much: 1     Feeling tired or having little energy: 2    Trouble concentrating on things, such as reading the newspaper or watching television: 1    Moving or speaking so slowly that other people could have noticed. Or the opposite - being so fidgety or restless that you have been moving around a lot more than usual: 3    If you checked off any problems, how difficult have these problems made it for you to do your work, take care of things at home, or get along with other people?: Extremely dIfficult           Advanced Directives:   He does have a Living Will but we do NOT have it on file in Epic.    He does have a POA but we do NOT have it on file in Epic.    Not discussed      Problem List[1]  Allergies:  He is allergic to clindamycin and neomycin-polymyxin b.    Current Medications:  Active Meds, Sig Only[2]    Medical History:  He  has a past medical history of High blood pressure,  High cholesterol, Hypogonadism in male, Neuropathy, Osteoarthritis, Osteoarthritis of left knee (2012), and Other and unspecified hyperlipidemia.  Surgical History:  He  has a past surgical history that includes other surgical history; Eye surgery; and cataract.   Family History:  His family history includes Cancer in his mother; Diabetes in his father; Other in his mother.  Social History:  He  reports that he has quit smoking. He has never used smokeless tobacco. He reports current alcohol use. He reports that he does not use drugs.    Tobacco:  He smoked tobacco in the past but quit greater than 12 months ago.  Tobacco Use[3]     CAGE Alcohol Screen:   CAGE screening score of 0 on 6/25/2025, showing low risk of alcohol abuse.      Patient Care Team:  Leroy Casanova MD as PCP - General (Family Medicine)  Carmina Ngo PT as Physical Therapist (Physical Therapy)    Review of Systems   Constitutional:  Negative for fatigue, fever and unexpected weight change.   HENT:  Negative for sore throat and trouble swallowing.    Respiratory:  Negative for cough and shortness of breath.    Cardiovascular:  Negative for chest pain, palpitations and leg swelling.   Gastrointestinal:  Negative for abdominal pain, constipation, diarrhea, nausea and vomiting.   Genitourinary:  Negative for difficulty urinating.   Neurological:  Positive for weakness. Negative for dizziness, light-headedness and headaches.         Objective:   Physical Exam  Vitals reviewed.   Constitutional:       General: He is not in acute distress.  HENT:      Right Ear: Tympanic membrane, ear canal and external ear normal.      Left Ear: Tympanic membrane, ear canal and external ear normal.      Nose: Nose normal. No congestion or rhinorrhea.      Mouth/Throat:      Mouth: Mucous membranes are moist.      Pharynx: Oropharynx is clear. No oropharyngeal exudate or posterior oropharyngeal erythema.   Eyes:      General: No scleral icterus.        Right  eye: No discharge.         Left eye: No discharge.      Conjunctiva/sclera: Conjunctivae normal.      Pupils: Pupils are equal, round, and reactive to light.   Cardiovascular:      Rate and Rhythm: Normal rate and regular rhythm.      Heart sounds: Normal heart sounds. No murmur heard.  Pulmonary:      Effort: No respiratory distress.      Breath sounds: Normal breath sounds. No wheezing or rales.   Abdominal:      General: Abdomen is flat. Bowel sounds are normal. There is no distension.      Palpations: Abdomen is soft. There is no mass.      Tenderness: There is no abdominal tenderness. There is no guarding or rebound.   Musculoskeletal:      Cervical back: Neck supple.      Right lower leg: No edema.      Left lower leg: No edema.      Comments: Atrophy noted on the calf muscles bilaterally.  There was also atrophy noted on the bilateral thigh muscles.   Lymphadenopathy:      Cervical: No cervical adenopathy.   Skin:     General: Skin is warm.      Findings: No rash.   Neurological:      General: No focal deficit present.      Mental Status: He is alert and oriented to person, place, and time. Mental status is at baseline.      Cranial Nerves: No cranial nerve deficit.   Psychiatric:         Mood and Affect: Mood normal.         Behavior: Behavior normal.         /68   Pulse 66   Temp 98.2 °F (36.8 °C) (Temporal)   Resp 16   Wt 113 lb (51.3 kg)   SpO2 98%   BMI 17.18 kg/m²  Estimated body mass index is 17.18 kg/m² as calculated from the following:    Height as of 1/3/25: 5' 8\" (1.727 m).    Weight as of this encounter: 113 lb (51.3 kg).    Medicare Hearing Assessment:   Hearing Screening    Screening Method: Whisper Test  Whisper Test Result: Pass               Assessment & Plan:   Rajinder Adam is a 79 year old male who presents for a Medicare Assessment.     1. Encounter for annual wellness exam in Medicare patient (Primary)  2. Heart failure with reduced ejection fraction (HCC)  Overview:  Sees  Cardiology  stable  3. Controlled type 2 diabetes mellitus without complication, without long-term current use of insulin (HCC)  Overview:  Diet controlled  Orders:  -     CBC With Differential With Platelet; Future; Expected date: 06/25/2025  -     Hemoglobin A1C; Future; Expected date: 06/25/2025  -     TSH and Free T4; Future; Expected date: 06/25/2025  -     Comp Metabolic Panel (14); Future; Expected date: 06/25/2025  -     Lipid Panel; Future; Expected date: 06/25/2025  -     Microalb/Creat Ratio, Random Urine; Future; Expected date: 06/25/2025  4. Cerebral ataxia (HCC)  Overview:  Stable  Uses wheelchair to ambulate  Orders:  -     RESIDENTIAL HOME HEALTH REFERRAL  5. Screening for malignant neoplasm of prostate  -     PSA Total, Screen; Future; Expected date: 06/25/2025  6. MCI (mild cognitive impairment)  -     Memantine HCl; Take 1 tablet (5 mg total) by mouth at bedtime.  Dispense: 90 tablet; Refill: 1  7. Hypogonadism in male  -     Androderm; Place 1 patch onto the skin See Admin Instructions. 5 days a week and apply 2 patches on Saturday and Sunday  Dispense: 30 patch; Refill: 0  Other orders  -     Ofloxacin; Place 1 drop into both eyes 4 (four) times daily.  Dispense: 10 mL; Refill: 0          We had a lengthy discussion and had recommended strongly to him to stay here in [place] as he has better care here in my opinion compared to when he was in [place].  He does receive 24/7 from a home care individual plus he will also has his family members close by.  However I would want for him to do in-home physical therapy for strengthening and gait exercises.  I strongly believe that his weakness is due to underlying atrophy of his lower extremities possibly due to ataxia.  We will start him on Namenda 5 mg at nighttime to hopefully help with his memory.  The patient indicates understanding of these issues and agrees to the plan.  Consult ordered.  Continue with current treatment plan.  Follow up in  3   month(s).  Lab work ordered.  Prescription medication ordered.  Reinforced healthy diet, lifestyle, and exercise.      Return in about 6 months (around 12/25/2025), or if symptoms worsen or fail to improve, for HTN/30.     Leroy Casanova MD, 6/25/2025     Supplementary Documentation:   General Health:  In the past six months, have you lost more than 10 pounds without trying?: 2 - No  Has your appetite been poor?: No  Type of Diet: Balanced, Low Salt  How does the patient maintain a good energy level?: Other  How would you describe your daily physical activity?: None  How would you describe your current health state?: Fair  How do you maintain positive mental well-being?: Visiting Family  On a scale of 0 to 10, with 0 being no pain and 10 being severe pain, what is your pain level?: 0 - (None)  In the past six months, have you experienced urine leakage?: 0-No  At any time do you feel concerned for the safety/well-being of yourself and/or your children, in your home or elsewhere?: No  Have you had any immunizations at another office such as Influenza, Hepatitis B, Tetanus, or Pneumococcal?: No    Health Maintenance   Topic Date Due    Diabetes Care Foot Exam  Never done    Diabetes Care Dilated Eye Exam  Never done    Pneumococcal Vaccine: 50+ Years (1 of 2 - PCV) Never done    Zoster Vaccines (1 of 2) Never done    COVID-19 Vaccine (4 - 2024-25 season) 09/01/2024    Annual Physical  11/20/2024    Annual Depression Screening  01/01/2025    Diabetes Care: Microalb/Creat Ratio (Annual)  01/01/2025    Diabetes Care A1C  01/30/2025    Influenza Vaccine (Season Ended) 10/01/2025    Diabetes Care: GFR  01/08/2026    Fall Risk Screening (Annual)  Completed    Meningococcal B Vaccine  Aged Out            [1]   Patient Active Problem List  Diagnosis    Cerebral ataxia (HCC)    Essential hypertension    Pure hypercholesterolemia    Knee instability    Claustrophobia    PAD (peripheral artery disease)    Osteoarthritis of left  knee    Hand arthritis    Cutaneous tag    Hypogonadism in male    Pre-syncope    Abnormal EKG    Heart failure with reduced ejection fraction (HCC)    Takotsubo cardiomyopathy    Ischemic cardiomyopathy    Other hyperlipidemia    Peripheral neuropathy    Controlled type 2 diabetes mellitus, without long-term current use of insulin (HCC)    Hypertension    Dyslipidemia    Atherosclerosis of coronary artery    Ataxia    History of non-ST elevation myocardial infarction (NSTEMI)   [2]   Outpatient Medications Marked as Taking for the 6/25/25 encounter (Office Visit) with Leroy Casanova MD   Medication Sig    Testosterone (ANDRODERM) 4 MG/24HR Transdermal Patch 24 Hr Place 1 patch onto the skin See Admin Instructions. 5 days a week and apply 2 patches on Saturday and Sunday    memantine 5 MG Oral Tab Take 1 tablet (5 mg total) by mouth at bedtime.    ofloxacin 0.3 % Ophthalmic Solution Place 1 drop into both eyes 4 (four) times daily.    lisinopril 10 MG Oral Tab Take 1 tablet (10 mg total) by mouth daily.    OneTouch Delica Lancets Fine Does not apply Misc Dm    spironolactone 25 MG Oral Tab Take 0.5 tablets (12.5 mg total) by mouth daily.    Prasugrel HCl 10 MG Oral Tab Take 1 tablet (10 mg total) by mouth daily.    Multiple Vitamin (DAILY VITAMINS) Oral Tab Take 1 tablet by mouth daily.   [3]   Social History  Tobacco Use   Smoking Status Former   Smokeless Tobacco Never   Tobacco Comments    quit many years ago

## 2025-06-25 NOTE — PATIENT INSTRUCTIONS
Thank you for choosing Leroy Casanova MD at [facility]  To Do: Rajinder GERA Jair  1. Please see age appropriate health prevention below     Call [phone] to schedule the appointment.   Please signup for Vocation, which is electronic access to your record if you have not done so.  All your results will post on there.  [email or web address]   You can NOW use Vocation to book your appointments with us, or consider using open access scheduling which is through the [place] website [email or web address] and type in Leroy Casanova MD and follow the links for \"Schedule Online Now\"    To schedule Imaging or tests at [place] call Central Scheduling [phone], Go to [facility] ER Building (For example: CT scans, X rays, Ultrasound, MRI)  Cardiac Testing in ER building Building A second floor Cardiac Testing [phone] (For example: Holter Monitor, Cardiac Stress tests,Event Monitor, or 2D Echocardiograms)  [facility] call [phone] usually in [facility]  Walk in Clinic in [place] at [address] Mon-Fri at 8am-7:30 p.m., and Sat/Sun 9:00a.m.-4:30 p.m.  Also at [address] [place] [address]  Call [phone] for info     Please call our office about any questions regarding your treatment/medicines/tests as a result of today's visit.  For your safety, read the entire package insert of all medicines prescribed to you and be aware of all of the risks of treatment even beyond those discussed today.  All therapies have potential risk of harm or side effects or medication interactions.  It is your duty and for your safety to discuss with the pharmacist and our office with questions, and to notify us and stop treatment if problems arise, but know that our intention is that the benefits outweigh those potential risks and we strive to make you healthier and to improve your quality of life.    Referrals, and Radiology Information:    If your insurance requires a referral to a specialist, please  allow 5 business days to process your referral request.    If Leroy Casanova MD orders a CT or MRI, it may take up to 10 business days to receive approval from your insurance company. Once our office has called informing you that the insurance company approved your testing, please call Central Scheduling at [phone]  Please allow our office 5 business days to contact you regarding any testing results.    Refill policies:   Allow 3 business days for refills; controlled substances may take longer and must be picked up from the office in person.  Narcotic medications can only be filled in 30 day increments and must be refilled at an office visit only.  If your prescription is due for a refill, you may be due for a follow-up appointment.  We cannot refill your maintenance medications at a preventative wellness visit.  To best provide you care, patients receiving maintenance medications need to be seen at least twice a year.

## 2025-07-01 ENCOUNTER — MED REC SCAN ONLY (OUTPATIENT)
Dept: FAMILY MEDICINE CLINIC | Facility: CLINIC | Age: 80
End: 2025-07-01

## 2025-07-02 RX ORDER — LISINOPRIL 10 MG/1
10 TABLET ORAL DAILY
Qty: 90 TABLET | Refills: 0 | Status: SHIPPED | OUTPATIENT
Start: 2025-07-02

## 2025-07-03 ENCOUNTER — TELEPHONE (OUTPATIENT)
Dept: FAMILY MEDICINE CLINIC | Facility: CLINIC | Age: 80
End: 2025-07-03

## 2025-07-03 DIAGNOSIS — G11.9 CEREBRAL ATAXIA (HCC): ICD-10-CM

## 2025-07-03 DIAGNOSIS — I73.9 PAD (PERIPHERAL ARTERY DISEASE): ICD-10-CM

## 2025-07-03 DIAGNOSIS — E11.9 CONTROLLED TYPE 2 DIABETES MELLITUS WITHOUT COMPLICATION, WITHOUT LONG-TERM CURRENT USE OF INSULIN (HCC): Primary | ICD-10-CM

## 2025-07-03 DIAGNOSIS — I50.20 HEART FAILURE WITH REDUCED EJECTION FRACTION (HCC): ICD-10-CM

## 2025-07-03 NOTE — TELEPHONE ENCOUNTER
Calling to request a verbal order to move speech therapy eval to next week as family has not called her back.

## (undated) NOTE — ED AVS SNAPSHOT
Trey Goss   MRN: [id number]    Department:  [facility] Emergency Department   Date of Visit:  10/13/2019           Disclosure     Insurance plans vary and the physician(s) referred by the ER may not be covered by your plan.  Please contact your tell this physician (or your personal doctor if your instructions are to return to your personal doctor) about any new or lasting problems. The primary care or specialist physician will see patients referred from the [facility] Emergency Department.  Joslyn Seo

## (undated) NOTE — IP AVS SNAPSHOT
[address]            (For Outpatient Use Only) Initial Admit Date: 7/8/2020   Inpt/Obs Admit Date: Inpt: 7/8/20 / Obs: N/A   Discharge Date:    Doneen Forrest:  [de-identified]   MRN: [de-identified]   CSN: [id number]   CEID: [id number] Group Number:  Insurance Type:    Subscriber Name:  Subscriber :    Subscriber ID:  Pt Rel to Subscriber:    Hospital Account Financial Class: Medicare    July 10, 2020

## (undated) NOTE — ED AVS SNAPSHOT
THE [facility] Emergency Department in [address]    Phone:  [phone]    Fax:  [address]   MRN: [id number]    Department:  THE [facility] Emergency Department in [place]   Date of Visit: IF THERE IS ANY CHANGE OR WORSENING OF YOUR CONDITION, CALL YOUR PRIMARY CARE PHYSICIAN AT ONCE OR RETURN IMMEDIATELY TO THE EMERGENCY DEPARTMENT.     If you have been prescribed any medication(s), please fill your prescription right away and begin taking t

## (undated) NOTE — LETTER
Patient Name: Ese Sheikh  YOB: 1945          MRN :  [id number]  Date:  9/30/2020  Referring Physician:  Dr. Vale Dooley has attended 13 visits in Physical Therapy.    Dx: ALPA shld adhesive capsulitis     Next MD visit: · Pt will increase shoulder AROM ER to 50 deg to be able to reach and fasten seatbelt -MET  · Pt will improve shoulder strength throughout to 4/5 to improve function with light housework and holding pans in kitchen - mild progress  · Pt will be independent

## (undated) NOTE — LETTER
11/03/2017          Félix      Dear Deirdre Torres,     We are contacting you from Dr. Jennifer Dior office. Your health is important to us.  We have not received test results for additional tests that your prov

## (undated) NOTE — LETTER
12/04/2017        Francescodevante      [facility]     We are contacting you from Dr. Dorian Summers office. Your health is important to us.  We have not received test results for additional tests that your provid

## (undated) NOTE — LETTER
08/13/17    Dear Dr. Julissa Joseph      Thank you for referring your patient, Leonor Patterson to me for an evaluation. Please see my initial consult note enclosed below. Let me know if you have any questions.     Thank you  Kaia Hoyt MD, Neurology  Cami Donaldson nausea, double vision/ blurry vision / loss of vision, chest pain, palpitations, shortness of breath, rashes, joint pains, bowel / bladder incontinence or mood issues.      Past Medical History:   Diagnosis Date   • Arthritis    • Hypogonadism in male    • GENERAL: well developed, well nourished, in no apparent distress  SKIN: no rashes  EYES: sclera anicteric, conjunctiva normal  HEENT: normocephalic  CARDIOVASCULAR: S1, S2 normal, RRR  LUNGS: clear to auscultation bilaterally  EXTREMITIES: no cyanosis, per 2+ in UE throughout; 4+ in bilateral LE with +upgoing toes bilaterally; no clonus          Gait:  Unable to assess - unsteady on standing and unable to walk without assistance    TEST RESULTS/DATA REVIEWED:     Imaging:     CT brain (6/18/17)    FINDINGS: toes, there is concern for underlying myelopathy, which could be component of spinal cerebellar atrophy.   CT of the brain was previously done and demonstrated cerebellar atrophy, but will plan to check MRI of the brain in order to evaluate for more subtle

## (undated) NOTE — LETTER
01/04/2018    Dear Alanna Reyes,    We are contacting you from Dr. Napier Horse office. Our office has sent multiple reminders to have testing completed and we have not yet received results.     Testing ordered: 10/04/17    Although it is important to complete alfonzo

## (undated) NOTE — IP AVS SNAPSHOT
Patient Demographics     Address  [id number] [place] [address]  [address] [facility] [place] [address] Phone  [phone] (Home)  [phone] [facility]) E-mail Address  [email or web address]      Emergency Contact(s)     Name Relation Home Work Mobile    Marine Swanson [phone] Start taking on:  July 11, 2020  Next dose due: Tomorrow 7/11      Take 1 tablet (81 mg total) by mouth daily. Hillary Adames MD         Daily Vitamins Tabs  Next dose due: Tomorrow 7/11      Take 1 tablet by mouth daily.           [facility] Bring a paper prescription for each of these medications  FreeStyle R [place] 14 [facility]           [id number] - MAR ACTION REPORT  (last 24 hrs)    ** SITE UNKNOWN **     Order ID Medication Name Action Time Action Reason Chloride 106 98 - 112 mmol/L — [facility] ([facility])   CO2 30.0 21.0 - 32.0 mmol/L — [facility] ([facility])   Anion Gap 2 0 - 18 mmol/L — [facility] ([facility])   BUN 25 7 - 18 mg/dL H [facility])   Creatinine 1.03 0.70 - 1.30 mg/dL — [facility])   BUN/CREA Ratio Hosp Day #[JM.1] 0[JM.2] PCP[JM.1] Farida Whatley MD[JM.2]     Chief Complaint: fall    History of Present Illness:[JM.1] Lopez Adam[JM.2] is a[JM.3 76year old[JM.2] male with fall.   Was in bathroom and felt dizzy and weak and fell through glass door Disp: , Rfl:[JM.2]         Review of Systems:   A comprehensive 14 point review of systems was completed. Pertinent positives and negatives noted in the HPI.     Physical Exam:[JM.1]    /45   Pulse (!) 49   Temp 99.1 °F (37.3 °C) (Temporal)   Resp 3. Reduced EF; defer timing of acei and BB to cards; hold off tonight unless otherwise stated by cards  4.  Hx gout  5. htn    Will do med rec once review complete    Quality:  · DVT Prophylaxis: lovenox  · CODE status: full  · Avila: no    Plan of care dis • Osteoarthritis of left knee 2012   • Other and unspecified hyperlipidemia[JM.2]         Past Surgical History:[JM.1]   Past Surgical History:   Procedure Laterality Date   • CATARACT     • EYE SURGERY     • OTHER SURGICAL HISTORY      RCR left[JM.2] Respiratory: Clear to auscultation bilaterally. No wheezes. No rhonchi. Cardiovascular: S1, S2. Regular rate and rhythm. No murmurs, rubs or gallops. Chest and Back: No tenderness or deformity. Abdomen: Soft, nontender, nondistended.    No rebound, guar JM.2 - Xavier Aguilar MD on 7/8/2020  7:11 PM  JAGRUTI. 3 - Xavier Aguilar MD on 7/8/2020  9:13 PM               H&P signed by Xavier Aguilar MD at 7/8/2020  7:15 PM  Version 1 of 3    Author:  Xavier Aguilar MD Service:  Hospitalist Author Type:  Physician Medications:[JM.1]  No current facility-administered medications on file prior to encounter. lisinopril 10 MG Oral Tab, Take 10 mg by mouth daily. , Disp: , Rfl:   Rosuvastatin Calcium 10 MG Oral Tab, Take 10 mg by mouth daily. , Disp: , Rfl:   David GFRNAA 71   CA 9.2   ALB 3.8      K 4.4      CO2 27.0   ALKPHO 96   AST 45*   ALT 55   BILT 0.3   TP 7.2       Estimated Creatinine Clearance: 59.7 mL/min (based on SCr of 1.03 mg/dL).     Recent Labs   Lab 07/08/20  1541   PTP 13.7   INR 1.02 Hosp Day #[SS.1] 0[SS.2] PCP[SS.1] Noy Ventura MD[SS.2]     Reason for Consultation:  Abnormal ECG.       History of Present Illness:[SS.1]  Cici Adam[SS.2] is a a(n)[SS.3 76year old[SS.2] male with chronic ataxia, balance issues, and rec dizzines congestive-like symptoms but mechanical fall.[SS.3]  Respiratory: No cough, wheezing or hemoptysis, no dyspnea. Hematologic/Lymphatic: No easy bruising or bleeding. Integumentary: No rash or suspicious lesions on the skin.    Musculoskeletal: No arthriti diffuse with radiculopathy and leg weakness and ataxia, chronic.   Uses walker for ambulation.[SS.3]  Musculoskeletal: normal range of motion, normal muscle strength, no joint effusion.[SS.1]  Chronic leg weakness with ataxia and balance issues and uses wal with Takotsubo's/stress cardiomyopathy by stat echo in ER. LAD disease not excluded. 3[SS.4].  Chronic ataxia, cervical spine multilevel disc dx with chronic deformities, balance issues, and rec dizziness, evaluated by neurology few times in the past, p *Luzmaria 3  One Nile Segovia inferior, and    apical septal myocardium. 3. Aortic valve: Trileaflet; mildly thickened leaflets. Transvalvular    velocity was within the normal range. There was no stenosis. Mild to    moderate, 2+ regurgitation.  4. Pulmonary arteries: Systolic pressure Pericardium:  There was no pericardial effusion. Aorta: Aortic root: The aortic root was normal. Ascending aorta: The ascending aorta was normal. Pulmonary artery:   Poorly visualized. Systolic pressure was mildly increased, estimated to be 41mm Hg.  Syste pressure, S, DP                              41    mm Hg  ---------   Tricuspid valve                                 Value        Reference  Tricuspid regurg peak velocity                  3     m/sec  ---------  Tricuspid peak RV-RA gradient irregularities. LV gram reveals severely reduced LV systolic function with LVEF of 25-30% with classic appearance of stress Takotsubo cardiomyopathy with akinetic/severely hypokinetic distal half of the heart with hyperdynamic base of the heart.   Trivial M Filed:  7/10/2020 10:15 AM Date of Service:  7/10/2020  9:59 AM Status:  Signed    :  Miriam Anthony PT (Physical Therapist)             PHYSICAL THERAPY EVALUATION - INPATIENT     Room Number: 3687/6382-S  Evaluation Date: 7/10/2020  Type of Ev fits through bathroom door. He has step over tub, shower chair, elevated toilet with grab bars. He states to get dressed he \"braces against wall\" to pull up his pants/underwear.  He states he has special shoes but does not wear them.[EP.2]     SUBJECTIVE[ Left Knee extension  3-/5  Right Knee flexion  3+/5  Left Knee flexion  3+/5  Right Dorsiflexion  3-/5  Left Dorsiflexion  3-/5[EP.2]    BALANCE  Static Sitting: Fair -  Dynamic Sitting: Poor  Static Standing: Not tested  Dynamic Standing: Not tested    AD positioning.,activity. PPE donned for session: gloves,  surgical mask[EP.2]       Exercise/Education Provided:[EP.1]  Functional activity tolerated  Transfer training[EP.2]    Patient End of Session: Up in chair;Call light within reach; Needs met;RN aware o Goal #1 Patient is able to demonstrate supine - sit EOB @ level:[EP.1] CGA[EP.2]     Goal #2 Patient is able to demonstrate transfers[EP.1] EOB to/from Chair/Wheelchair[EP.2] at assistance level:[EP.1] minimum assistance[EP.2]     Goal #3[EP.1]   Assess tr Takotsubo cardiomyopathy      Past Medical History  Past Medical History:   Diagnosis Date   • High blood pressure    • High cholesterol    • Hypogonadism in male    • Neuropathy    • Osteoarthritis    • Osteoarthritis of left knee 2012   • Other and uns Light touch BUE WFL  C/o numbness BLE    Communication: WFL    Behavioral/Emotional/Social: pleasant, cooperative    RANGE OF MOTION AND STRENGTH ASSESSMENT  Upper extremity ROM is within functional limits except:  Right Shoulder flexion <1/4 ROM, 3/4 PROM Disha-care: mod A  Patient End of Session: Up in chair; With [facility] staff;Needs met;Call light within reach;RN aware of session/findings; All patient questions and concerns addressed    ASSESSMENT     Patient is a 76year old male admitted on 7/8/2020 for fall, N OT Discharge Recommendations: Sub-acute rehabilitation(ELOS: 12-14 days)  OT Device Recommendations: TBD    PLAN  OT Treatment Plan: Balance activities; Energy conservation/work simplification techniques;ADL training;Functional transfer training;UE strength Description:  Patient's Long Term Goal: ***    Interventions:  - ***  - See additional Care Plan goals for specific interventions   Patient/Family Short Term Goal   (Resolved)     Interdisciplinary Completed    Description:  Patient's Short Term Goal: ***

## (undated) NOTE — ED AVS SNAPSHOT
THE [facility] Emergency Department in [address] [facility]    Phone:  [phone]    Fax:  [facility]   MRN: [id number]    Department:  THE [facility] Emergency Department in [place]   Date of Visit: If you have any problems with your follow-up, please call our  at [phone]    Si usted tiene algun problema con thomas sequimiento, por favor llame a nuestro adminstrador de casos al [phone]    Expect to receive an electronic request Aixa Thomas [address] ([address]) Adriana ([address] [id number] [facility]9   [facility] [address] (Jordana Fierro) [address]  (Do COMPARISON:  [place], CT BRAIN OR HEAD ([id number]), 9/26/2014, 16:02. INDICATIONS:  FALL HEAD INJURY struck the head contusion     TECHNIQUE:  Noncontrast CT scanning is performed through the brain. Dose reduction techniques were used.  Dose information Expires: 8/17/2017 11:17 PM    If you have questions, you can call [phone] to talk to our [facility] Staff. Remember, [employer] is NOT to be used for urgent needs. For medical emergencies, dial 911.